# Patient Record
Sex: MALE | Race: WHITE | NOT HISPANIC OR LATINO | Employment: OTHER | URBAN - METROPOLITAN AREA
[De-identification: names, ages, dates, MRNs, and addresses within clinical notes are randomized per-mention and may not be internally consistent; named-entity substitution may affect disease eponyms.]

---

## 2019-02-01 ENCOUNTER — TELEPHONE (OUTPATIENT)
Dept: SCHEDULING | Facility: IMAGING CENTER | Age: 29
End: 2019-02-01

## 2019-03-11 ENCOUNTER — OFFICE VISIT (OUTPATIENT)
Dept: MEDICAL GROUP | Facility: MEDICAL CENTER | Age: 29
End: 2019-03-11
Attending: FAMILY MEDICINE
Payer: MEDICAID

## 2019-03-11 VITALS
WEIGHT: 300 LBS | HEART RATE: 76 BPM | TEMPERATURE: 98.2 F | SYSTOLIC BLOOD PRESSURE: 102 MMHG | OXYGEN SATURATION: 93 % | RESPIRATION RATE: 16 BRPM | DIASTOLIC BLOOD PRESSURE: 70 MMHG | HEIGHT: 71 IN | BODY MASS INDEX: 42 KG/M2

## 2019-03-11 DIAGNOSIS — R41.89 COGNITIVE DEFICITS: ICD-10-CM

## 2019-03-11 DIAGNOSIS — L73.2 LEFT AXILLARY HIDRADENITIS: ICD-10-CM

## 2019-03-11 PROCEDURE — 99203 OFFICE O/P NEW LOW 30 MIN: CPT | Performed by: FAMILY MEDICINE

## 2019-03-11 RX ORDER — ONDANSETRON 4 MG/1
4 TABLET, ORALLY DISINTEGRATING ORAL EVERY 6 HOURS PRN
COMMUNITY
End: 2022-04-11

## 2019-03-11 RX ORDER — LOPERAMIDE HYDROCHLORIDE 2 MG/1
2 CAPSULE ORAL 4 TIMES DAILY PRN
COMMUNITY
End: 2022-04-11

## 2019-03-11 ASSESSMENT — ENCOUNTER SYMPTOMS
FEVER: 0
SPUTUM PRODUCTION: 0
ABDOMINAL PAIN: 0
CHILLS: 0
SHORTNESS OF BREATH: 0
NAUSEA: 0
NEUROLOGICAL NEGATIVE: 1
VOMITING: 0
MUSCULOSKELETAL NEGATIVE: 1
PALPITATIONS: 0
EYES NEGATIVE: 1
PSYCHIATRIC NEGATIVE: 1
COUGH: 0

## 2019-03-11 ASSESSMENT — PAIN SCALES - GENERAL: PAINLEVEL: NO PAIN

## 2019-03-11 NOTE — PROGRESS NOTES
Subjective:      Rufus Bianchi is a 28 y.o. male who presents with New Patient and Cyst (underm, left )            Patient 28-year-old male here to establish with the clinic today.    He is currently under the care of case [facility].  He is here to establish for general care.  But has been having a recurrent boil under his left arm.  The boil is currently not present, but patient states that it does pop up randomly.  He is not having any issues with it at this time.  But he has been advised if he has any issues with the boil to return to clinic for further evaluation.  Will give patient information on hidradenitis suprativa.  And will continue to follow.    Discussed diet and exercising to help lower his BMI.  Stressed decreasing his fatty and fried food intake as well as increasing his fiber intake.  Also discussed exercising 4-5 times weekly for 20-30 minutes at a time.  We will continue to follow.    He has no other past medical history.    He also has no surgical history.    He is unaware of any significant family medical history.    He does have difficulty managing his daily activities due to cognitive deficiencies.  He is under the care of case services [facility].    He denies smoking tobacco, denies drinking alcohol and denies other substance use.        Review of Systems   Constitutional: Negative for chills and fever.   HENT: Negative for hearing loss and tinnitus.    Eyes: Negative.    Respiratory: Negative for cough, sputum production and shortness of breath.    Cardiovascular: Negative for chest pain and palpitations.   Gastrointestinal: Negative for abdominal pain, nausea and vomiting.   Genitourinary: Negative.    Musculoskeletal: Negative.    Skin: Negative for rash.   Neurological: Negative.    Endo/Heme/Allergies: Negative.    Psychiatric/Behavioral: Negative.           Objective:     /70 (BP Location: Right arm, Patient Position: Sitting, BP Cuff Size: Large adult)   Pulse 76   Temp  "36.8 °C (98.2 °F) (Temporal)   Resp 16   Ht 1.803 m (5' 11\")   Wt (!) 136.1 kg (300 lb)   SpO2 93%   BMI 41.84 kg/m²      Physical Exam   Constitutional: He is oriented to person, place, and time.   BMI 41.84   HENT:   Head: Normocephalic and atraumatic.   Nose: Nose normal.   Mouth/Throat: Oropharynx is clear and moist.   Eyes: Pupils are equal, round, and reactive to light. Conjunctivae and EOM are normal.   Neck: Normal range of motion. Neck supple. No thyromegaly present.   Cardiovascular: Normal rate, regular rhythm and normal heart sounds.  Exam reveals no friction rub.    No murmur heard.  Pulmonary/Chest: Effort normal and breath sounds normal. No respiratory distress. He has no wheezes. He has no rales.   Abdominal: Soft. Bowel sounds are normal. He exhibits no distension. There is no tenderness.   Musculoskeletal: Normal range of motion.   Lymphadenopathy:     He has no cervical adenopathy.   Neurological: He is alert and oriented to person, place, and time.   Skin: Skin is warm and dry.   Psychiatric: He has a normal mood and affect. His behavior is normal.   Nursing note and vitals reviewed.              Assessment/Plan:     1. BMI 40.0-44.9, adult (HCC)  Patient is currently in the process of trying to watch his diet as well as exercising as tolerated to decrease his BMI.    2. Left axillary hidradenitis  Information on hidradenitis has been given to patient.  We will continue to follow.    3. Cognitive deficits  He is under the care of case management service.  We will continue to follow.        "

## 2019-04-18 ENCOUNTER — OFFICE VISIT (OUTPATIENT)
Dept: MEDICAL GROUP | Facility: MEDICAL CENTER | Age: 29
End: 2019-04-18
Attending: FAMILY MEDICINE
Payer: MEDICAID

## 2019-04-18 VITALS
RESPIRATION RATE: 16 BRPM | OXYGEN SATURATION: 95 % | DIASTOLIC BLOOD PRESSURE: 80 MMHG | HEIGHT: 71 IN | TEMPERATURE: 97.5 F | SYSTOLIC BLOOD PRESSURE: 120 MMHG | WEIGHT: 297 LBS | HEART RATE: 74 BPM | BODY MASS INDEX: 41.58 KG/M2

## 2019-04-18 DIAGNOSIS — J01.00 ACUTE MAXILLARY SINUSITIS, RECURRENCE NOT SPECIFIED: ICD-10-CM

## 2019-04-18 PROCEDURE — 99214 OFFICE O/P EST MOD 30 MIN: CPT | Performed by: FAMILY MEDICINE

## 2019-04-18 PROCEDURE — 99212 OFFICE O/P EST SF 10 MIN: CPT | Performed by: FAMILY MEDICINE

## 2019-04-18 RX ORDER — AMOXICILLIN 500 MG/1
500 CAPSULE ORAL 3 TIMES DAILY
Qty: 30 CAP | Refills: 0 | Status: SHIPPED | OUTPATIENT
Start: 2019-04-18 | End: 2022-04-11

## 2019-04-18 ASSESSMENT — ENCOUNTER SYMPTOMS
SWOLLEN GLANDS: 1
CHILLS: 0
SORE THROAT: 1
HEADACHES: 0
WHEEZING: 0
SHORTNESS OF BREATH: 0
ABDOMINAL PAIN: 0
VOMITING: 0
DIARRHEA: 1
RHINORRHEA: 1
NECK PAIN: 0
SPUTUM PRODUCTION: 1
COUGH: 1
PALPITATIONS: 0
SINUS PAIN: 0
FEVER: 1
NAUSEA: 0

## 2019-04-18 NOTE — PROGRESS NOTES
"Subjective:      Rufus Bianchi is a 29 y.o. male who presents with Cough            URI    This is a new problem. The current episode started in the past 7 days. Maximum temperature: feels feverish. Associated symptoms include congestion, coughing, diarrhea, rhinorrhea, a sore throat and swollen glands. Pertinent negatives include no abdominal pain, chest pain, dysuria, ear pain, headaches, joint pain, joint swelling, nausea, neck pain, plugged ear sensation, rash, sinus pain, sneezing, vomiting or wheezing. Treatments tried: We will have him continue to use his over-the-counter medications as directed.  If still sick in 7 to 10 days we will start amoxicillin.       Review of Systems   Constitutional: Positive for fever. Negative for chills.   HENT: Positive for congestion, rhinorrhea and sore throat. Negative for ear pain, hearing loss, sinus pain, sneezing and tinnitus.    Respiratory: Positive for cough and sputum production. Negative for shortness of breath and wheezing.    Cardiovascular: Negative for chest pain and palpitations.   Gastrointestinal: Positive for diarrhea. Negative for abdominal pain, nausea and vomiting.   Genitourinary: Negative for dysuria.   Musculoskeletal: Negative for joint pain and neck pain.   Skin: Negative for rash.   Neurological: Negative for headaches.          Objective:     /80 (BP Location: Left arm, Patient Position: Sitting)   Pulse 74   Temp 36.4 °C (97.5 °F)   Resp 16   Ht 1.803 m (5' 11\")   Wt (!) 134.7 kg (297 lb)   SpO2 95%   BMI 41.42 kg/m²      Physical Exam   Constitutional: He is oriented to person, place, and time.   BMI 41.42   HENT:   Head: Normocephalic and atraumatic.   Congestion, TM dull bilaterally, enlarged tonsils, slight maxillary sinus tenderness   Neck: Normal range of motion. Neck supple. No thyromegaly present.   Cardiovascular: Normal rate, regular rhythm and normal heart sounds.  Exam reveals no friction rub.    No murmur " heard.  Pulmonary/Chest: Effort normal and breath sounds normal. No respiratory distress. He has no wheezes. He has no rales.   Abdominal: Soft. Bowel sounds are normal. He exhibits no distension. There is no tenderness.   Lymphadenopathy:     He has cervical adenopathy.   Neurological: He is alert and oriented to person, place, and time.   Skin: Skin is warm and dry.   Psychiatric: He has a normal mood and affect. His behavior is normal.   Nursing note and vitals reviewed.              Assessment/Plan:     1. Acute maxillary sinusitis, recurrence not specified  We will have patient continue using his over-the-counter medication as directed, if still having symptoms in 7 to 10 days we will start amoxicillin 500 mg 3 times daily.  We will continue to follow.  - amoxicillin (AMOXIL) 500 MG Cap; Take 1 Cap by mouth 3 times a day.  Dispense: 30 Cap; Refill: 0

## 2022-04-11 ENCOUNTER — OFFICE VISIT (OUTPATIENT)
Dept: URGENT CARE | Facility: CLINIC | Age: 32
End: 2022-04-11
Payer: MEDICAID

## 2022-04-11 VITALS
SYSTOLIC BLOOD PRESSURE: 120 MMHG | HEART RATE: 81 BPM | DIASTOLIC BLOOD PRESSURE: 66 MMHG | OXYGEN SATURATION: 97 % | HEIGHT: 73 IN | BODY MASS INDEX: 28.76 KG/M2 | TEMPERATURE: 97.2 F | RESPIRATION RATE: 16 BRPM | WEIGHT: 217 LBS

## 2022-04-11 DIAGNOSIS — J06.9 VIRAL URI: ICD-10-CM

## 2022-04-11 DIAGNOSIS — R05.9 COUGH: ICD-10-CM

## 2022-04-11 PROCEDURE — 99203 OFFICE O/P NEW LOW 30 MIN: CPT | Performed by: NURSE PRACTITIONER

## 2022-04-11 ASSESSMENT — ENCOUNTER SYMPTOMS
SORE THROAT: 1
FEVER: 0
DIARRHEA: 0
SHORTNESS OF BREATH: 0
CHILLS: 0
RHINORRHEA: 1
MYALGIAS: 0
EYE PAIN: 0
VOMITING: 0
COUGH: 1
NAUSEA: 0
DIZZINESS: 0
HEADACHES: 0

## 2022-04-11 NOTE — PROGRESS NOTES
"Subjective:   Rufus Bianchi is a 31 y.o. male who presents for Cough (Stuffy nose, dry throat, congestion x 3 days)      URI   This is a new problem. Episode onset: 3 days; denies sick contacts covid testing negative  The problem has been unchanged. There has been no fever. Associated symptoms include congestion, coughing, rhinorrhea and a sore throat. Pertinent negatives include no chest pain, diarrhea, ear pain, headaches, nausea, rash or vomiting. He has tried sleep for the symptoms. The treatment provided no relief.       Review of Systems   Constitutional: Positive for malaise/fatigue. Negative for chills and fever.   HENT: Positive for congestion, rhinorrhea and sore throat. Negative for ear pain.    Eyes: Negative for pain.   Respiratory: Positive for cough. Negative for shortness of breath.    Cardiovascular: Negative for chest pain.   Gastrointestinal: Negative for diarrhea, nausea and vomiting.   Genitourinary: Negative for hematuria.   Musculoskeletal: Negative for myalgias.   Skin: Negative for rash.   Neurological: Negative for dizziness and headaches.       Medications:    • MUCINEX ALLERGY PO    Allergies: Patient has no known allergies.    Problem List: Rufus Bianchi does not have any pertinent problems on file.    Surgical History:  No past surgical history on file.    Past Social Hx: Rufus Bianchi  reports that he has never smoked. He has never used smokeless tobacco. He reports that he does not drink alcohol and does not use drugs.     Past Family Hx:  Rufus Bianchi Family history is unknown by patient.     Problem list, medications, and allergies reviewed by myself today in Epic.     Objective:     /66 (BP Location: Left arm, Patient Position: Sitting, BP Cuff Size: Adult)   Pulse 81   Temp 36.2 °C (97.2 °F) (Temporal)   Resp 16   Ht 1.854 m (6' 1\")   Wt 98.4 kg (217 lb)   SpO2 97%   BMI 28.63 kg/m²     Physical Exam  Vitals and nursing note reviewed.   Constitutional:       General: He " is not in acute distress.     Appearance: He is well-developed.   HENT:      Head: Normocephalic and atraumatic.      Right Ear: Tympanic membrane and external ear normal.      Left Ear: Tympanic membrane and external ear normal.      Nose: Nose normal.      Right Sinus: No maxillary sinus tenderness or frontal sinus tenderness.      Left Sinus: No maxillary sinus tenderness or frontal sinus tenderness.      Mouth/Throat:      Mouth: Mucous membranes are moist.      Pharynx: Uvula midline. No posterior oropharyngeal erythema.      Tonsils: No tonsillar exudate or tonsillar abscesses.   Eyes:      General:         Right eye: No discharge.         Left eye: No discharge.      Conjunctiva/sclera: Conjunctivae normal.   Cardiovascular:      Rate and Rhythm: Normal rate.   Pulmonary:      Effort: Pulmonary effort is normal. No respiratory distress.      Breath sounds: Normal breath sounds.   Abdominal:      General: There is no distension.   Musculoskeletal:         General: Normal range of motion.   Skin:     General: Skin is warm and dry.   Neurological:      General: No focal deficit present.      Mental Status: He is alert and oriented to person, place, and time. Mental status is at baseline.      Gait: Gait (gait at baseline) normal.   Psychiatric:         Judgment: Judgment normal.         Assessment/Plan:     Diagnosis and associated orders:     1. Viral URI     2. Cough        Comments/MDM:     It was explained today that due to the viral nature of the pt's illness, we will treat symptomatically today.   Encouraged OTC supportive meds PRN. Humidification, increase fluids, avoid night time dairy.   Discussed side effects of OTC meds and any prescribed.  Given precautionary s/sx that mandate immediate follow up and evaluation in the ED. Advised of risks of not doing so.    DDX, Supportive care, and indications for immediate follow-up discussed with patient.    Instructed to return to clinic or nearest emergency  department if we are not available for any change in condition, further concerns, or worsening of symptoms.    The patient  and/or guardian demonstrated a good understanding and agreed with the treatment plan.    Please note that this dictation was created using voice recognition software. I have made every reasonable attempt to correct obvious errors, but I expect that there are errors of grammar and possibly content that I did not discover before finalizing the note.  •               Please note that this dictation was created using voice recognition software. I have made a reasonable attempt to correct obvious errors, but I expect that there are errors of grammar and possibly content that I did not discover before finalizing the note.    This note was electronically signed by Simone HARRIS.

## 2022-06-01 ENCOUNTER — APPOINTMENT (OUTPATIENT)
Dept: INTERNAL MEDICINE | Facility: OTHER | Age: 32
End: 2022-06-01
Payer: MEDICAID

## 2022-07-01 ENCOUNTER — OFFICE VISIT (OUTPATIENT)
Dept: INTERNAL MEDICINE | Facility: OTHER | Age: 32
End: 2022-07-01
Payer: MEDICAID

## 2022-07-01 VITALS
OXYGEN SATURATION: 93 % | SYSTOLIC BLOOD PRESSURE: 111 MMHG | BODY MASS INDEX: 29.72 KG/M2 | DIASTOLIC BLOOD PRESSURE: 76 MMHG | TEMPERATURE: 98.7 F | WEIGHT: 219.4 LBS | HEIGHT: 72 IN | HEART RATE: 72 BPM

## 2022-07-01 DIAGNOSIS — Z13.228 SCREENING FOR METABOLIC DISORDER: ICD-10-CM

## 2022-07-01 DIAGNOSIS — F84.0 AUTISM SPECTRUM DISORDER: ICD-10-CM

## 2022-07-01 DIAGNOSIS — E66.9 OBESITY (BMI 30-39.9): ICD-10-CM

## 2022-07-01 PROBLEM — L73.2 LEFT AXILLARY HIDRADENITIS: Status: RESOLVED | Noted: 2019-03-11 | Resolved: 2022-07-01

## 2022-07-01 PROCEDURE — 99204 OFFICE O/P NEW MOD 45 MIN: CPT | Mod: GC

## 2022-07-01 ASSESSMENT — ENCOUNTER SYMPTOMS
BRUISES/BLEEDS EASILY: 0
NAUSEA: 0
BLOOD IN STOOL: 0
PALPITATIONS: 0
BLURRED VISION: 0
DOUBLE VISION: 0
VOMITING: 0
WHEEZING: 0
SPUTUM PRODUCTION: 0
ORTHOPNEA: 0
CLAUDICATION: 0
ABDOMINAL PAIN: 0
CONSTIPATION: 0
INSOMNIA: 0
DIARRHEA: 0
NECK PAIN: 0
FEVER: 0
WEIGHT LOSS: 0
BACK PAIN: 0
SENSORY CHANGE: 0
NERVOUS/ANXIOUS: 0
WEAKNESS: 0
DIZZINESS: 0
HEARTBURN: 0
COUGH: 0
DEPRESSION: 0
SHORTNESS OF BREATH: 0
SORE THROAT: 0
EYE DISCHARGE: 0
HEADACHES: 0
CHILLS: 0

## 2022-07-01 ASSESSMENT — PATIENT HEALTH QUESTIONNAIRE - PHQ9
CLINICAL INTERPRETATION OF PHQ2 SCORE: 1
SUM OF ALL RESPONSES TO PHQ QUESTIONS 1-9: 7
5. POOR APPETITE OR OVEREATING: 0 - NOT AT ALL

## 2022-07-01 NOTE — ASSESSMENT & PLAN NOTE
Patient is on the autism spectrum disorder, very mild cognitive dysfunction, high functioning.  -Continue to monitor  -Continue to work with Rhett

## 2022-07-01 NOTE — PROGRESS NOTES
Subjective:     CC:  Diagnoses of Autism spectrum disorder, Obesity (BMI 30-39.9), and Screening for metabolic disorder were pertinent to this visit.    HISTORY OF THE PRESENT ILLNESS: Patient is a 32 y.o. male. This pleasant patient is here today to establish care.  His past medical history is significant for autism spectrum disorder, patient is high functioning.  He is very healthy.  Denies any complaints this morning.  He has very mild cognitive deficit.  Previously patient had a diagnosis of morbid obesity with a BMI of over 40, patient lost over 100 pounds with exercise and diet and is currently at a BMI of 30.1.  Patient presents with his caretaker who is a , Rhett.     Patient lives at home with his mother and stepfather.  Is a very healthy and stable home environment.  Patient does not work.  Social history is not significant for any tobacco, or drug use.  Patient very occasionally drinks wine and ice restaurant with his family.  No other stressors in life.    Patient continues to have a healthy diet.  He also continues to exercise multiple times a week: Kickboxing 3 times a week, aquatic aerobics twice a week.  He also is on a weight watchers program.  Patient takes multivitamins but no prescription medication.  Has seasonal allergies but he does not take any medications for that.    Overall, patient is very healthy, and denies any acute or chronic complaints.    Problem   Autism Spectrum Disorder   Obesity (Bmi 30-39.9)   Screening for Metabolic Disorder   Autistic Disorder   Bmi 40.0-44.9, Adult (Hcc) (Resolved)   Left Axillary Hidradenitis (Resolved)       Health Maintenance: Completed    ROS:   Review of Systems   Constitutional: Negative for chills, fever and weight loss.   HENT: Negative for congestion, hearing loss and sore throat.    Eyes: Negative for blurred vision, double vision and discharge.   Respiratory: Negative for cough, sputum production, shortness of breath and wheezing.   "  Cardiovascular: Negative for chest pain, palpitations, orthopnea, claudication and leg swelling.   Gastrointestinal: Negative for abdominal pain, blood in stool, constipation, diarrhea, heartburn, melena, nausea and vomiting.   Genitourinary: Negative for dysuria, frequency, hematuria and urgency.   Musculoskeletal: Negative for back pain, joint pain and neck pain.   Skin: Negative for rash.   Neurological: Negative for dizziness, sensory change, weakness and headaches.   Endo/Heme/Allergies: Negative for environmental allergies. Does not bruise/bleed easily.   Psychiatric/Behavioral: Negative for depression. The patient is not nervous/anxious and does not have insomnia.          Objective:     Exam: /76 (BP Location: Left arm, Patient Position: Sitting, BP Cuff Size: Adult)   Pulse 72   Temp 37.1 °C (98.7 °F) (Temporal)   Ht 1.816 m (5' 11.5\")   Wt 99.5 kg (219 lb 6.4 oz)   SpO2 93%  Body mass index is 30.17 kg/m².    Physical Exam  Constitutional:       General: He is not in acute distress.     Appearance: Normal appearance. He is normal weight. He is not ill-appearing.   HENT:      Head: Normocephalic and atraumatic.      Right Ear: External ear normal.      Left Ear: External ear normal.      Nose: Nose normal. No rhinorrhea.      Mouth/Throat:      Mouth: Mucous membranes are moist.      Pharynx: Oropharynx is clear.   Eyes:      General:         Right eye: No discharge.         Left eye: No discharge.      Extraocular Movements: Extraocular movements intact.      Pupils: Pupils are equal, round, and reactive to light.   Neck:      Vascular: No carotid bruit.   Cardiovascular:      Rate and Rhythm: Normal rate and regular rhythm.      Pulses: Normal pulses.      Heart sounds: Normal heart sounds. No murmur heard.    No friction rub.   Pulmonary:      Effort: Pulmonary effort is normal. No respiratory distress.      Breath sounds: Normal breath sounds. No wheezing or rhonchi.   Abdominal:      " General: Abdomen is flat. Bowel sounds are normal.      Palpations: Abdomen is soft.      Tenderness: There is no abdominal tenderness. There is no right CVA tenderness or left CVA tenderness.      Hernia: No hernia is present.   Musculoskeletal:         General: No tenderness. Normal range of motion.      Cervical back: Normal range of motion. No rigidity.      Right lower leg: No edema.      Left lower leg: No edema.   Skin:     General: Skin is warm and dry.      Capillary Refill: Capillary refill takes less than 2 seconds.      Findings: No lesion or rash.   Neurological:      General: No focal deficit present.      Mental Status: He is alert and oriented to person, place, and time. Mental status is at baseline.   Psychiatric:         Mood and Affect: Mood normal.         Behavior: Behavior normal.         Thought Content: Thought content normal.         Judgment: Judgment normal.       A chaperone was offered to the patient during today's exam. Chaperone name: Rhett () was present.    Labs:   No new labs to be reviewed at this time.    Assessment & Plan:   32 y.o. male with the following -    Problem List Items Addressed This Visit     Autism spectrum disorder     Patient is on the autism spectrum disorder, very mild cognitive dysfunction, high functioning.  -Continue to monitor  -Continue to work with Rhett            Obesity (BMI 30-39.9)     Patient lost over 100 pounds with diet and exercise.  Current BMI is 30.1, down from over 40.  -Continue to engage in healthy diet and exercise habits.           Relevant Orders    Patient identified as having weight management issue.  Appropriate orders and counseling given.    Screening for metabolic disorder     Since patient is establishing, order lab work to establish baseline.  -Patient has no acute complaints. Very pleasant and healthy individual.           Relevant Orders    CBC WITH DIFFERENTIAL    Comp Metabolic Panel    TSH WITH REFLEX TO  FT4    Lipid Profile    HEMOGLOBIN A1C          I spent a total of 55 minutes with record review, exam, communication with the patient, communication with other providers, and documentation of this encounter.    Return in about 1 year (around 7/1/2023).    Please note that this dictation was created using voice recognition software. I have made every reasonable attempt to correct obvious errors, but I expect that there are errors of grammar and possibly content that I did not discover before finalizing the note.

## 2022-07-01 NOTE — ASSESSMENT & PLAN NOTE
Patient lost over 100 pounds with diet and exercise.  Current BMI is 30.1, down from over 40.  -Continue to engage in healthy diet and exercise habits.

## 2022-07-01 NOTE — ASSESSMENT & PLAN NOTE
Since patient is establishing, order lab work to establish baseline.  -Patient has no acute complaints. Very pleasant and healthy individual.

## 2022-07-01 NOTE — PATIENT INSTRUCTIONS
-You came to the clinic today to establish care with me.  -You are very healthy, we discussed your exercise and eating habits.  Please continue to engage in exercise multiple times a week.  Congratulations on losing your weight, lets continue to keep that weight off with diet, exercise, and healthy lifestyle.  -I ordered some lab work to establish a baseline: CBC, CMP, thyroid, lipid panel, and hemoglobin A1c.  -Return to clinic in 1 year

## 2022-07-11 ENCOUNTER — OFFICE VISIT (OUTPATIENT)
Dept: INTERNAL MEDICINE | Facility: OTHER | Age: 32
End: 2022-07-11
Payer: MEDICAID

## 2022-07-11 VITALS
HEIGHT: 72 IN | WEIGHT: 220.4 LBS | BODY MASS INDEX: 29.85 KG/M2 | SYSTOLIC BLOOD PRESSURE: 105 MMHG | DIASTOLIC BLOOD PRESSURE: 69 MMHG | TEMPERATURE: 97.8 F | OXYGEN SATURATION: 97 % | HEART RATE: 67 BPM

## 2022-07-11 DIAGNOSIS — Z00.00 PREVENTATIVE HEALTH CARE: ICD-10-CM

## 2022-07-11 PROBLEM — Z13.228 SCREENING FOR METABOLIC DISORDER: Status: RESOLVED | Noted: 2022-07-01 | Resolved: 2022-07-11

## 2022-07-11 PROCEDURE — 99395 PREV VISIT EST AGE 18-39: CPT | Mod: EP,GC | Performed by: STUDENT IN AN ORGANIZED HEALTH CARE EDUCATION/TRAINING PROGRAM

## 2022-07-12 ASSESSMENT — ENCOUNTER SYMPTOMS
VOMITING: 0
DEPRESSION: 0
CHILLS: 0
NAUSEA: 0
DIARRHEA: 0
SHORTNESS OF BREATH: 0
PALPITATIONS: 0
BLURRED VISION: 0
HEMOPTYSIS: 0
CONSTIPATION: 0
DOUBLE VISION: 0
BLOOD IN STOOL: 0
COUGH: 0
FEVER: 0
SPUTUM PRODUCTION: 0

## 2022-07-12 NOTE — PROGRESS NOTES
Subjective:     CC: Preventative visit.    HPI:   Patient is a 32-year-old male past medical history of cognitive deficits, autism spectrum disorder, and obesity who presented to clinic today for a preventative exam with his caretaker Yehuda.    Patient denies any acute complaints at this time.  Patient's caretaker, Yehuda, also denies any acute concerns for patient since previous visit last week.    Patient is physically active and exercises regularly.  He is also working towards improving his diet and eating healthier.    Patient is up-to-date with vaccinations at this time.    No other concerns at this time    Problem   Screening for Metabolic Disorder (Resolved)       Health Maintenance: Completed    ROS:  Review of Systems   Constitutional: Negative for chills and fever.   HENT: Negative for ear pain, hearing loss and tinnitus.    Eyes: Negative for blurred vision and double vision.   Respiratory: Negative for cough, hemoptysis, sputum production and shortness of breath.    Cardiovascular: Negative for chest pain, palpitations and leg swelling.   Gastrointestinal: Negative for blood in stool, constipation, diarrhea, melena, nausea and vomiting.   Genitourinary: Negative for dysuria and urgency.   Skin: Negative for itching and rash.   Psychiatric/Behavioral: Negative for depression and suicidal ideas.       Objective:     Exam:  /69 (BP Location: Left arm, Patient Position: Sitting, BP Cuff Size: Adult)   Pulse 67   Temp 36.6 °C (97.8 °F) (Temporal)   Ht 1.829 m (6')   Wt 100 kg (220 lb 6.4 oz)   SpO2 97%   BMI 29.89 kg/m²  Body mass index is 29.89 kg/m².    Physical Exam  Constitutional:       General: He is not in acute distress.     Appearance: Normal appearance. He is normal weight. He is not ill-appearing, toxic-appearing or diaphoretic.   HENT:      Head: Normocephalic and atraumatic.      Mouth/Throat:      Mouth: Mucous membranes are moist.      Pharynx: Oropharynx is clear. No oropharyngeal  exudate or posterior oropharyngeal erythema.   Eyes:      General: No scleral icterus.        Right eye: No discharge.         Left eye: No discharge.      Conjunctiva/sclera: Conjunctivae normal.   Cardiovascular:      Rate and Rhythm: Normal rate and regular rhythm.      Pulses: Normal pulses.      Heart sounds: Normal heart sounds. No murmur heard.    No friction rub. No gallop.   Pulmonary:      Effort: Pulmonary effort is normal. No respiratory distress.      Breath sounds: Normal breath sounds. No stridor. No wheezing or rhonchi.   Abdominal:      General: Abdomen is flat. Bowel sounds are normal. There is no distension.      Palpations: Abdomen is soft. There is no mass.      Tenderness: There is no abdominal tenderness.      Hernia: No hernia is present.   Musculoskeletal:         General: No swelling or tenderness.      Right lower leg: No edema.      Left lower leg: No edema.   Skin:     General: Skin is warm and dry.      Coloration: Skin is not pale.      Findings: No erythema or rash.   Neurological:      General: No focal deficit present.      Mental Status: He is alert and oriented to person, place, and time. Mental status is at baseline.         Labs: Please see results section for further information.    Assessment & Plan:     Patient is a 32-year-old male past medical history of cognitive deficits, autism spectrum disorder, and obesity who presented to clinic today for a preventative exam with his caretaker Yehuda.    1. Preventative health care  -Patient still has lab work pending.  Patient reports he will work on getting his lab work done at his earliest convenience.  -Patient is up-to-date with his vaccinations and adult health maintenance.  -Full physical exam done and form filled out for patient before discharge.  -Patient working on increasing physical activity and working on eating healthy at this time.  -Patient denies any other concerns at this time.      Problem List Items Addressed This  Visit    None         I spent a total of 45 minutes with record review, exam, communication with the patient, communication with other providers, and documentation of this encounter.      Return in about 1 year (around 7/11/2023).

## 2022-08-10 ENCOUNTER — HOSPITAL ENCOUNTER (OUTPATIENT)
Dept: LAB | Facility: MEDICAL CENTER | Age: 32
End: 2022-08-10
Payer: MEDICAID

## 2022-08-10 DIAGNOSIS — Z13.228 SCREENING FOR METABOLIC DISORDER: ICD-10-CM

## 2022-08-10 LAB
ALBUMIN SERPL BCP-MCNC: 4.8 G/DL (ref 3.2–4.9)
ALBUMIN/GLOB SERPL: 1.7 G/DL
ALP SERPL-CCNC: 50 U/L (ref 30–99)
ALT SERPL-CCNC: 14 U/L (ref 2–50)
ANION GAP SERPL CALC-SCNC: 9 MMOL/L (ref 7–16)
AST SERPL-CCNC: 15 U/L (ref 12–45)
BASOPHILS # BLD AUTO: 1.1 % (ref 0–1.8)
BASOPHILS # BLD: 0.05 K/UL (ref 0–0.12)
BILIRUB SERPL-MCNC: 0.5 MG/DL (ref 0.1–1.5)
BUN SERPL-MCNC: 19 MG/DL (ref 8–22)
CALCIUM SERPL-MCNC: 9.8 MG/DL (ref 8.5–10.5)
CHLORIDE SERPL-SCNC: 104 MMOL/L (ref 96–112)
CHOLEST SERPL-MCNC: 148 MG/DL (ref 100–199)
CO2 SERPL-SCNC: 25 MMOL/L (ref 20–33)
CREAT SERPL-MCNC: 1.19 MG/DL (ref 0.5–1.4)
EOSINOPHIL # BLD AUTO: 0.11 K/UL (ref 0–0.51)
EOSINOPHIL NFR BLD: 2.4 % (ref 0–6.9)
ERYTHROCYTE [DISTWIDTH] IN BLOOD BY AUTOMATED COUNT: 40.9 FL (ref 35.9–50)
EST. AVERAGE GLUCOSE BLD GHB EST-MCNC: 97 MG/DL
FASTING STATUS PATIENT QL REPORTED: NORMAL
GFR SERPLBLD CREATININE-BSD FMLA CKD-EPI: 84 ML/MIN/1.73 M 2
GLOBULIN SER CALC-MCNC: 2.9 G/DL (ref 1.9–3.5)
GLUCOSE SERPL-MCNC: 88 MG/DL (ref 65–99)
HBA1C MFR BLD: 5 % (ref 4–5.6)
HCT VFR BLD AUTO: 46.1 % (ref 42–52)
HDLC SERPL-MCNC: 41 MG/DL
HGB BLD-MCNC: 15.2 G/DL (ref 14–18)
IMM GRANULOCYTES # BLD AUTO: 0 K/UL (ref 0–0.11)
IMM GRANULOCYTES NFR BLD AUTO: 0 % (ref 0–0.9)
LDLC SERPL CALC-MCNC: 93 MG/DL
LYMPHOCYTES # BLD AUTO: 1.62 K/UL (ref 1–4.8)
LYMPHOCYTES NFR BLD: 35.8 % (ref 22–41)
MCH RBC QN AUTO: 29.4 PG (ref 27–33)
MCHC RBC AUTO-ENTMCNC: 33 G/DL (ref 33.7–35.3)
MCV RBC AUTO: 89.2 FL (ref 81.4–97.8)
MONOCYTES # BLD AUTO: 0.39 K/UL (ref 0–0.85)
MONOCYTES NFR BLD AUTO: 8.6 % (ref 0–13.4)
NEUTROPHILS # BLD AUTO: 2.36 K/UL (ref 1.82–7.42)
NEUTROPHILS NFR BLD: 52.1 % (ref 44–72)
NRBC # BLD AUTO: 0 K/UL
NRBC BLD-RTO: 0 /100 WBC
PLATELET # BLD AUTO: 197 K/UL (ref 164–446)
PMV BLD AUTO: 10 FL (ref 9–12.9)
POTASSIUM SERPL-SCNC: 4.5 MMOL/L (ref 3.6–5.5)
PROT SERPL-MCNC: 7.7 G/DL (ref 6–8.2)
RBC # BLD AUTO: 5.17 M/UL (ref 4.7–6.1)
SODIUM SERPL-SCNC: 138 MMOL/L (ref 135–145)
TRIGL SERPL-MCNC: 69 MG/DL (ref 0–149)
TSH SERPL DL<=0.005 MIU/L-ACNC: 0.95 UIU/ML (ref 0.38–5.33)
WBC # BLD AUTO: 4.5 K/UL (ref 4.8–10.8)

## 2022-08-10 PROCEDURE — 85025 COMPLETE CBC W/AUTO DIFF WBC: CPT

## 2022-08-10 PROCEDURE — 84443 ASSAY THYROID STIM HORMONE: CPT

## 2022-08-10 PROCEDURE — 83036 HEMOGLOBIN GLYCOSYLATED A1C: CPT

## 2022-08-10 PROCEDURE — 80061 LIPID PANEL: CPT

## 2022-08-10 PROCEDURE — 80053 COMPREHEN METABOLIC PANEL: CPT

## 2022-08-10 PROCEDURE — 36415 COLL VENOUS BLD VENIPUNCTURE: CPT

## 2022-08-15 ENCOUNTER — TELEPHONE (OUTPATIENT)
Dept: HOSPITALIST | Facility: MEDICAL CENTER | Age: 32
End: 2022-08-15
Payer: MEDICAID

## 2022-08-15 NOTE — TELEPHONE ENCOUNTER
Called patient at the number listed in the chart to update about results.  The results are within normal limits.  Unable to leave voicemail since voicemail is not set up.  We will try calling again.

## 2023-03-07 ENCOUNTER — OFFICE VISIT (OUTPATIENT)
Dept: URGENT CARE | Facility: CLINIC | Age: 33
End: 2023-03-07
Payer: MEDICAID

## 2023-03-07 VITALS
SYSTOLIC BLOOD PRESSURE: 100 MMHG | TEMPERATURE: 98.6 F | BODY MASS INDEX: 33.6 KG/M2 | WEIGHT: 240 LBS | HEART RATE: 101 BPM | HEIGHT: 71 IN | DIASTOLIC BLOOD PRESSURE: 70 MMHG | OXYGEN SATURATION: 94 % | RESPIRATION RATE: 12 BRPM

## 2023-03-07 DIAGNOSIS — R11.0 NAUSEA: ICD-10-CM

## 2023-03-07 DIAGNOSIS — K52.9 ACUTE GASTROENTERITIS: ICD-10-CM

## 2023-03-07 PROCEDURE — 99214 OFFICE O/P EST MOD 30 MIN: CPT | Performed by: NURSE PRACTITIONER

## 2023-03-07 RX ORDER — ONDANSETRON 4 MG/1
4 TABLET, ORALLY DISINTEGRATING ORAL EVERY 6 HOURS PRN
Qty: 10 TABLET | Refills: 0 | Status: SHIPPED | OUTPATIENT
Start: 2023-03-07 | End: 2023-07-19

## 2023-03-07 ASSESSMENT — FIBROSIS 4 INDEX: FIB4 SCORE: 0.63

## 2023-03-07 NOTE — PROGRESS NOTES
Chief Complaint   Patient presents with    Nausea     Nausea, diarrhea x 1 night       HISTORY OF PRESENT ILLNESS: Patient is a pleasant 32 y.o. male who presents to urgent care today with his caregiver, both provide the history.  Patient developed GI symptoms last night.  Since then he has had several episodes of diarrhea and vomiting.  Endorses associated bloating sensation.  Denies any fever, significant abdominal pain.  Notes that his mother was ill recently with similar symptoms.  He has not tried any medication for symptom relief.    Patient Active Problem List    Diagnosis Date Noted    Autism spectrum disorder 07/01/2022    Obesity (BMI 30-39.9) 07/01/2022    Cognitive deficits 03/11/2019    Autistic disorder 12/04/2014       Allergies:Patient has no known allergies.    Current Outpatient Medications Ordered in Epic   Medication Sig Dispense Refill    ondansetron (ZOFRAN ODT) 4 MG TABLET DISPERSIBLE Take 1 Tablet by mouth every 6 hours as needed for Nausea/Vomiting. 10 Tablet 0     No current Epic-ordered facility-administered medications on file.       History reviewed. No pertinent past medical history.    Social History     Tobacco Use    Smoking status: Never    Smokeless tobacco: Never   Vaping Use    Vaping Use: Never used   Substance Use Topics    Alcohol use: Never    Drug use: Never       No family status information on file.     Family History   Family history unknown: Yes       ROS:  Review of Systems   Constitutional: Negative for fever, chills, weight loss, malaise, and fatigue.   HENT: Negative for ear pain, nosebleeds, congestion, sore throat and neck pain.    Eyes: Negative for vision changes.   Neuro: Negative for headache, sensory changes, weakness, seizure, LOC.   Cardiovascular: Negative for chest pain, palpitations, orthopnea and leg swelling.   Respiratory: Negative for cough, sputum production, shortness of breath and wheezing.   Gastrointestinal: Positive for bloating, nausea,  "vomiting, diarrhea.  Negative for abdominal pain.  Genitourinary: Negative for dysuria, urgency and frequency.  Musculoskeletal: Negative for falls, neck pain, back pain, joint pain, myalgias.   Skin: Negative for rash, diaphoresis.     Exam:  /70   Pulse (!) 101   Temp 37 °C (98.6 °F) (Temporal)   Resp 12   Ht 1.803 m (5' 11\")   Wt 109 kg (240 lb)   SpO2 94%   General: well-nourished, well-developed male in NAD  Head: normocephalic, atraumatic  Eyes: PERRLA, no conjunctival injection, acuity grossly intact, lids normal.  Ears: normal shape and symmetry, no tenderness, no discharge. External canals are without any significant edema or erythema. Tympanic membranes are without any inflammation, no effusion. Gross auditory acuity is intact.  Nose: symmetrical without tenderness, no discharge.  Mouth/Throat: reasonable hygiene, no erythema, exudates or tonsillar enlargement.  Neck: no masses, range of motion within normal limits, no tracheal deviation. No obvious thyroid enlargement.   Lymph: no cervical adenopathy. No supraclavicular adenopathy.   Neuro: alert and oriented. Cranial nerves 1-12 grossly intact. No sensory deficit.   Cardiovascular: regular rate auscultated 90, regular rhythm. No edema.  Pulmonary: no distress. Chest is symmetrical with respiration, no wheezes, crackles, or rhonchi.   Abdomen: soft, non-tender, no guarding, no hepatosplenomegaly.  Musculoskeletal: no clubbing, appropriate muscle tone, gait is stable.  Skin: warm, dry, intact, no clubbing, no cyanosis, no rashes.   Psych: appropriate mood, affect, judgement.         Assessment/Plan:  1. Acute gastroenteritis        2. Nausea  ondansetron (ZOFRAN ODT) 4 MG TABLET DISPERSIBLE          Discussed symptoms most likely viral, self limiting illness. Did not see any evidence of a bacterial process. Discussed natural progression and sx care.  Increase fluid intake, Zofran as needed.  Advance diet as tolerated.  Supportive care, " differential diagnoses, and indications for immediate follow-up discussed with patient.   Pathogenesis of diagnosis discussed including typical length and natural progression.   Instructed to return to clinic or nearest emergency department for any change in condition, further concerns, or worsening of symptoms.  Patient states understanding of the plan of care and discharge instructions.  Instructed to make an appointment, for follow up, with his primary care provider.        Please note that this dictation was created using voice recognition software. I have made every reasonable attempt to correct obvious errors, but I expect that there are errors of grammar and possibly content that I did not discover before finalizing the note.      FELICIANO Horton.

## 2023-04-30 DIAGNOSIS — F22 PARANOIA (HCC): ICD-10-CM

## 2023-04-30 DIAGNOSIS — F41.9 ANXIETY: ICD-10-CM

## 2023-06-14 ENCOUNTER — OFFICE VISIT (OUTPATIENT)
Dept: INTERNAL MEDICINE | Facility: OTHER | Age: 33
End: 2023-06-14
Payer: MEDICAID

## 2023-06-14 VITALS
HEIGHT: 72 IN | TEMPERATURE: 98.2 F | DIASTOLIC BLOOD PRESSURE: 76 MMHG | BODY MASS INDEX: 33.05 KG/M2 | HEART RATE: 75 BPM | WEIGHT: 244 LBS | OXYGEN SATURATION: 97 % | SYSTOLIC BLOOD PRESSURE: 117 MMHG

## 2023-06-14 DIAGNOSIS — Z13.228 SCREENING FOR METABOLIC DISORDER: ICD-10-CM

## 2023-06-14 PROCEDURE — 3078F DIAST BP <80 MM HG: CPT

## 2023-06-14 PROCEDURE — 99213 OFFICE O/P EST LOW 20 MIN: CPT | Mod: GC

## 2023-06-14 PROCEDURE — 3074F SYST BP LT 130 MM HG: CPT

## 2023-06-14 ASSESSMENT — ENCOUNTER SYMPTOMS
DOUBLE VISION: 0
DIARRHEA: 0
DEPRESSION: 0
WEAKNESS: 0
ABDOMINAL PAIN: 0
SENSORY CHANGE: 0
BRUISES/BLEEDS EASILY: 0
SORE THROAT: 0
SPUTUM PRODUCTION: 0
CLAUDICATION: 0
INSOMNIA: 0
CONSTIPATION: 0
BACK PAIN: 0
HEADACHES: 0
SHORTNESS OF BREATH: 0
FEVER: 0
BLOOD IN STOOL: 0
NECK PAIN: 0
WHEEZING: 0
EYE DISCHARGE: 0
PALPITATIONS: 0
NERVOUS/ANXIOUS: 0
BLURRED VISION: 0
VOMITING: 0
DIZZINESS: 0
ORTHOPNEA: 0
NAUSEA: 0
HEARTBURN: 0
WEIGHT LOSS: 0
COUGH: 0
CHILLS: 0

## 2023-06-14 ASSESSMENT — PATIENT HEALTH QUESTIONNAIRE - PHQ9: CLINICAL INTERPRETATION OF PHQ2 SCORE: 0

## 2023-06-14 ASSESSMENT — FIBROSIS 4 INDEX: FIB4 SCORE: 0.65

## 2023-06-14 NOTE — PROGRESS NOTES
"Subjective:     CC: follow up on obesity and lab work.    HPI:   Rufus presents today with his caretaker for follow up on his previous visit.  His past medical history is significant for autism spectrum disorder, patient is high functioning.  He is very healthy.  Denies any complaints this morning.    Patient is keeping himself busy. He takes [employer] classes. Also helps his parents with home chores. Patient says that he is recently getting into photography and doing really well with that. Patient tracks his calories and activities with apps like weight watchers. No acute concerns today.       ROS:  Review of Systems   Constitutional:  Negative for chills, fever and weight loss.   HENT:  Negative for congestion, hearing loss and sore throat.    Eyes:  Negative for blurred vision, double vision and discharge.   Respiratory:  Negative for cough, sputum production, shortness of breath and wheezing.    Cardiovascular:  Negative for chest pain, palpitations, orthopnea, claudication and leg swelling.   Gastrointestinal:  Negative for abdominal pain, blood in stool, constipation, diarrhea, heartburn, melena, nausea and vomiting.   Genitourinary:  Negative for dysuria, frequency, hematuria and urgency.   Musculoskeletal:  Negative for back pain, joint pain and neck pain.   Skin:  Negative for rash.   Neurological:  Negative for dizziness, sensory change, weakness and headaches.   Endo/Heme/Allergies:  Negative for environmental allergies. Does not bruise/bleed easily.   Psychiatric/Behavioral:  Negative for depression. The patient is not nervous/anxious and does not have insomnia.        Objective:     Exam:  /76 (BP Location: Left arm, Patient Position: Sitting, BP Cuff Size: Adult long)   Pulse 75   Temp 36.8 °C (98.2 °F) (Temporal)   Ht 1.816 m (5' 11.5\")   Wt 111 kg (244 lb)   SpO2 97%   BMI 33.56 kg/m²  Body mass index is 33.56 kg/m².    Physical Exam  Constitutional:       General: He is not in acute " distress.     Appearance: Normal appearance. He is obese. He is not ill-appearing.   HENT:      Head: Normocephalic and atraumatic.      Right Ear: External ear normal.      Left Ear: External ear normal.      Nose: Nose normal. No rhinorrhea.      Mouth/Throat:      Mouth: Mucous membranes are moist.      Pharynx: Oropharynx is clear.   Eyes:      General:         Right eye: No discharge.         Left eye: No discharge.      Extraocular Movements: Extraocular movements intact.      Pupils: Pupils are equal, round, and reactive to light.   Neck:      Vascular: No carotid bruit.   Cardiovascular:      Rate and Rhythm: Normal rate and regular rhythm.      Pulses: Normal pulses.      Heart sounds: Normal heart sounds. No murmur heard.     No friction rub.   Pulmonary:      Effort: Pulmonary effort is normal. No respiratory distress.      Breath sounds: Normal breath sounds. No wheezing or rhonchi.   Abdominal:      General: Abdomen is flat. Bowel sounds are normal.      Palpations: Abdomen is soft.      Tenderness: There is no abdominal tenderness. There is no right CVA tenderness or left CVA tenderness.      Hernia: No hernia is present.   Musculoskeletal:         General: No tenderness. Normal range of motion.      Cervical back: Normal range of motion. No rigidity.      Right lower leg: No edema.      Left lower leg: No edema.   Skin:     General: Skin is warm and dry.      Capillary Refill: Capillary refill takes less than 2 seconds.      Findings: No lesion or rash.   Neurological:      General: No focal deficit present.      Mental Status: He is alert and oriented to person, place, and time. Mental status is at baseline.   Psychiatric:         Mood and Affect: Mood normal.         Behavior: Behavior normal.         Thought Content: Thought content normal.         Judgment: Judgment normal.         A chaperone was offered to the patient during today's exam.: Chaperone  was present.    Labs:   Previous  labs were discussed with the patient, no follow-up questions.  New lab work ordered for this visit.    Assessment & Plan:     33 y.o. male with the following -       1. Screening for metabolic disorder  2. Obesity  Patient is keeping himself busy. He takes [employer] classes. Also helps his parents with home chores. Patient says that he is recently getting into photography and doing really well with that. Patient tracks his calories and activities with apps like weight watchers. No acute concerns today.   - TSH WITH REFLEX TO FT4; Future  - HEMOGLOBIN A1C; Future    3. Autism Spectrum Disorder  Rufus presents today with his caretaker for follow up on his previous visit.  His past medical history is significant for autism spectrum disorder, patient is high functioning.  He is very healthy.  Denies any complaints this morning.      I spent a total of 30 minutes with record review, exam, communication with the patient, communication with other providers, and documentation of this encounter.    Return in about 5 weeks (around 7/19/2023).    Please note that this dictation was created using voice recognition software. I have made every reasonable attempt to correct obvious errors, but I expect that there are errors of grammar and possibly content that I did not discover before finalizing the note.

## 2023-07-11 ENCOUNTER — HOSPITAL ENCOUNTER (OUTPATIENT)
Dept: LAB | Facility: MEDICAL CENTER | Age: 33
End: 2023-07-11
Payer: MEDICAID

## 2023-07-11 DIAGNOSIS — Z13.228 SCREENING FOR METABOLIC DISORDER: ICD-10-CM

## 2023-07-11 LAB
EST. AVERAGE GLUCOSE BLD GHB EST-MCNC: 105 MG/DL
HBA1C MFR BLD: 5.3 % (ref 4–5.6)
TSH SERPL DL<=0.005 MIU/L-ACNC: 1.05 UIU/ML (ref 0.38–5.33)

## 2023-07-11 PROCEDURE — 36415 COLL VENOUS BLD VENIPUNCTURE: CPT

## 2023-07-11 PROCEDURE — 83036 HEMOGLOBIN GLYCOSYLATED A1C: CPT

## 2023-07-11 PROCEDURE — 84443 ASSAY THYROID STIM HORMONE: CPT

## 2023-07-17 SDOH — HEALTH STABILITY: PHYSICAL HEALTH: ON AVERAGE, HOW MANY MINUTES DO YOU ENGAGE IN EXERCISE AT THIS LEVEL?: 60 MIN

## 2023-07-17 SDOH — HEALTH STABILITY: PHYSICAL HEALTH: ON AVERAGE, HOW MANY DAYS PER WEEK DO YOU ENGAGE IN MODERATE TO STRENUOUS EXERCISE (LIKE A BRISK WALK)?: 4 DAYS

## 2023-07-17 SDOH — ECONOMIC STABILITY: HOUSING INSECURITY
IN THE LAST 12 MONTHS, WAS THERE A TIME WHEN YOU DID NOT HAVE A STEADY PLACE TO SLEEP OR SLEPT IN A SHELTER (INCLUDING NOW)?: NO

## 2023-07-17 SDOH — ECONOMIC STABILITY: TRANSPORTATION INSECURITY
IN THE PAST 12 MONTHS, HAS LACK OF TRANSPORTATION KEPT YOU FROM MEETINGS, WORK, OR FROM GETTING THINGS NEEDED FOR DAILY LIVING?: NO

## 2023-07-17 SDOH — ECONOMIC STABILITY: INCOME INSECURITY: IN THE LAST 12 MONTHS, WAS THERE A TIME WHEN YOU WERE NOT ABLE TO PAY THE MORTGAGE OR RENT ON TIME?: NO

## 2023-07-17 SDOH — ECONOMIC STABILITY: FOOD INSECURITY: WITHIN THE PAST 12 MONTHS, THE FOOD YOU BOUGHT JUST DIDN'T LAST AND YOU DIDN'T HAVE MONEY TO GET MORE.: NEVER TRUE

## 2023-07-17 SDOH — ECONOMIC STABILITY: FOOD INSECURITY: WITHIN THE PAST 12 MONTHS, YOU WORRIED THAT YOUR FOOD WOULD RUN OUT BEFORE YOU GOT MONEY TO BUY MORE.: NEVER TRUE

## 2023-07-17 SDOH — HEALTH STABILITY: MENTAL HEALTH

## 2023-07-17 SDOH — ECONOMIC STABILITY: INCOME INSECURITY: HOW HARD IS IT FOR YOU TO PAY FOR THE VERY BASICS LIKE FOOD, HOUSING, MEDICAL CARE, AND HEATING?: NOT HARD AT ALL

## 2023-07-17 SDOH — ECONOMIC STABILITY: HOUSING INSECURITY: IN THE LAST 12 MONTHS, HOW MANY PLACES HAVE YOU LIVED?: 1

## 2023-07-17 SDOH — ECONOMIC STABILITY: TRANSPORTATION INSECURITY
IN THE PAST 12 MONTHS, HAS LACK OF RELIABLE TRANSPORTATION KEPT YOU FROM MEDICAL APPOINTMENTS, MEETINGS, WORK OR FROM GETTING THINGS NEEDED FOR DAILY LIVING?: NO

## 2023-07-17 SDOH — ECONOMIC STABILITY: TRANSPORTATION INSECURITY
IN THE PAST 12 MONTHS, HAS THE LACK OF TRANSPORTATION KEPT YOU FROM MEDICAL APPOINTMENTS OR FROM GETTING MEDICATIONS?: NO

## 2023-07-17 ASSESSMENT — SOCIAL DETERMINANTS OF HEALTH (SDOH)
HOW OFTEN DO YOU GET TOGETHER WITH FRIENDS OR RELATIVES?: MORE THAN THREE TIMES A WEEK
ARE YOU MARRIED, WIDOWED, DIVORCED, SEPARATED, NEVER MARRIED, OR LIVING WITH A PARTNER?: NEVER MARRIED
IN A TYPICAL WEEK, HOW MANY TIMES DO YOU TALK ON THE PHONE WITH FAMILY, FRIENDS, OR NEIGHBORS?: MORE THAN THREE TIMES A WEEK
DO YOU BELONG TO ANY CLUBS OR ORGANIZATIONS SUCH AS CHURCH GROUPS UNIONS, FRATERNAL OR ATHLETIC GROUPS, OR SCHOOL GROUPS?: YES
DO YOU BELONG TO ANY CLUBS OR ORGANIZATIONS SUCH AS CHURCH GROUPS UNIONS, FRATERNAL OR ATHLETIC GROUPS, OR SCHOOL GROUPS?: YES
IN A TYPICAL WEEK, HOW MANY TIMES DO YOU TALK ON THE PHONE WITH FAMILY, FRIENDS, OR NEIGHBORS?: MORE THAN THREE TIMES A WEEK
HOW HARD IS IT FOR YOU TO PAY FOR THE VERY BASICS LIKE FOOD, HOUSING, MEDICAL CARE, AND HEATING?: NOT HARD AT ALL
HOW OFTEN DO YOU HAVE A DRINK CONTAINING ALCOHOL: NEVER
HOW OFTEN DO YOU GET TOGETHER WITH FRIENDS OR RELATIVES?: MORE THAN THREE TIMES A WEEK
HOW OFTEN DO YOU HAVE SIX OR MORE DRINKS ON ONE OCCASION: NEVER
HOW OFTEN DO YOU ATTENT MEETINGS OF THE CLUB OR ORGANIZATION YOU BELONG TO?: MORE THAN 4 TIMES PER YEAR
HOW MANY DRINKS CONTAINING ALCOHOL DO YOU HAVE ON A TYPICAL DAY WHEN YOU ARE DRINKING: PATIENT DOES NOT DRINK
HOW OFTEN DO YOU ATTEND CHURCH OR RELIGIOUS SERVICES?: NEVER
HOW OFTEN DO YOU ATTEND CHURCH OR RELIGIOUS SERVICES?: NEVER
HOW OFTEN DO YOU ATTENT MEETINGS OF THE CLUB OR ORGANIZATION YOU BELONG TO?: MORE THAN 4 TIMES PER YEAR
ARE YOU MARRIED, WIDOWED, DIVORCED, SEPARATED, NEVER MARRIED, OR LIVING WITH A PARTNER?: NEVER MARRIED
WITHIN THE PAST 12 MONTHS, YOU WORRIED THAT YOUR FOOD WOULD RUN OUT BEFORE YOU GOT THE MONEY TO BUY MORE: NEVER TRUE

## 2023-07-17 ASSESSMENT — LIFESTYLE VARIABLES
HOW OFTEN DO YOU HAVE A DRINK CONTAINING ALCOHOL: NEVER
SKIP TO QUESTIONS 9-10: 1
HOW OFTEN DO YOU HAVE SIX OR MORE DRINKS ON ONE OCCASION: NEVER
HOW MANY STANDARD DRINKS CONTAINING ALCOHOL DO YOU HAVE ON A TYPICAL DAY: PATIENT DOES NOT DRINK
AUDIT-C TOTAL SCORE: 0

## 2023-07-19 ENCOUNTER — OFFICE VISIT (OUTPATIENT)
Dept: INTERNAL MEDICINE | Facility: OTHER | Age: 33
End: 2023-07-19
Payer: MEDICAID

## 2023-07-19 VITALS
BODY MASS INDEX: 33.72 KG/M2 | OXYGEN SATURATION: 95 % | HEIGHT: 72 IN | DIASTOLIC BLOOD PRESSURE: 70 MMHG | HEART RATE: 76 BPM | SYSTOLIC BLOOD PRESSURE: 110 MMHG | WEIGHT: 249 LBS | TEMPERATURE: 98.3 F

## 2023-07-19 DIAGNOSIS — Z00.00 WELLNESS EXAMINATION: ICD-10-CM

## 2023-07-19 PROCEDURE — G0439 PPPS, SUBSEQ VISIT: HCPCS | Mod: GE

## 2023-07-19 PROCEDURE — 3078F DIAST BP <80 MM HG: CPT

## 2023-07-19 PROCEDURE — 3074F SYST BP LT 130 MM HG: CPT

## 2023-07-19 ASSESSMENT — FIBROSIS 4 INDEX: FIB4 SCORE: 0.65

## 2023-07-19 NOTE — PROGRESS NOTES
Subjective:     CC:   Chief Complaint   Patient presents with    Annual Exam       HPI:   Rufus Bianchi is a 33 y.o. male who presents for annual exam    Last Colorectal Cancer Screening: N/A  Last Tdap: Unknown   Received HPV series: Patient does not recall (not sexually active)  Hx STDs: No    Exercise: moderate regular exercise, aerobic < 3 days a week  Diet: mixed fruits and vegetables      He  has no past medical history on file.  He  has no past surgical history on file.    Family History   Family history unknown: Yes     Social History     Tobacco Use    Smoking status: Never    Smokeless tobacco: Never   Vaping Use    Vaping Use: Never used   Substance Use Topics    Alcohol use: Yes     Comment: occ    Drug use: Never     He  reports never being sexually active.    Patient Active Problem List    Diagnosis Date Noted    Autism spectrum disorder 07/01/2022    Obesity (BMI 30-39.9) 07/01/2022    Cognitive deficits 03/11/2019    Autistic disorder 12/04/2014     Current Outpatient Medications   Medication Sig Dispense Refill    ondansetron (ZOFRAN ODT) 4 MG TABLET DISPERSIBLE Take 1 Tablet by mouth every 6 hours as needed for Nausea/Vomiting. (Patient not taking: Reported on 6/14/2023) 10 Tablet 0     No current facility-administered medications for this visit.     No Known Allergies    Review of Systems   Constitutional: Negative for fever, chills and malaise/fatigue.   HENT: Negative for congestion.    Eyes: Negative for pain.   Respiratory: Negative for cough and shortness of breath.    Cardiovascular: Negative for chest pain and leg swelling.   Gastrointestinal: Negative for nausea, vomiting, abdominal pain and diarrhea.   Genitourinary: Negative for dysuria and hematuria.   Skin: Negative for rash.   Neurological: Negative for dizziness, focal weakness and headaches.   Endo/Heme/Allergies: Does not bruise/bleed easily.   Psychiatric/Behavioral: Negative for depression.  The patient is not nervous/anxious.  "     Objective:   /70 (BP Location: Right arm, Patient Position: Sitting, BP Cuff Size: Adult)   Pulse 76   Temp 36.8 °C (98.3 °F) (Temporal)   Ht 1.816 m (5' 11.5\")   Wt 113 kg (249 lb)   SpO2 95%   BMI 34.24 kg/m²      Wt Readings from Last 4 Encounters:   07/19/23 113 kg (249 lb)   06/14/23 111 kg (244 lb)   03/07/23 109 kg (240 lb)   07/11/22 100 kg (220 lb 6.4 oz)       A chaperone was offered to the patient during today's exam. Chaperone name: caretaker was present.    Physical Exam:  Constitutional: Well-developed and well-nourished. Not diaphoretic. No distress.   Skin: Skin is warm and dry. No rash noted.  Head: Atraumatic without lesions.  Eyes: Conjunctivae and extraocular motions are normal. Pupils are equal, round, and reactive to light. No scleral icterus.   Ears:  External ears unremarkable. Tympanic membranes clear and intact.  Nose: Nares patent. Septum midline. Turbinates without erythema nor edema. No discharge.   Mouth/Throat: Tongue normal. Oropharynx is clear and moist. Posterior pharynx without erythema or exudates.  Neck: Supple, trachea midline. Normal range of motion. No thyromegaly present. No lymphadenopathy--cervical or supraclavicular.  Cardiovascular: Regular rate and rhythm, S1 and S2 without murmur, rubs, or gallops.    Respiratory: Effort normal. Clear to auscultation throughout. No adventitious sounds.   Abdomen: Soft, non tender, and without distention. Active bowel sounds in all four quadrants. No rebound, guarding, masses or HSM.  Extremities: No cyanosis, clubbing, erythema, nor edema. Distal pulses intact and symmetric.   Musculoskeletal: All major joints AROM full in all directions without pain.  Neurological: Alert and oriented x 3. Grossly non-focal. Strength and sensation grossly intact. DTRs 2+/3 and symmetric.   Psychiatric:  Behavior, mood, and affect are appropriate.      Assessment and Plan:     There are no diagnoses linked to this encounter.    Health " maintenance: done   Labs per orders  Immunizations per orders  Patient counseled about skin care, diet, supplements, and exercise.  Discussed diet and exercise, colorectal cancer screening, STD prevention, HIV risk factors and prevention, family planning choices, adequate intake of calcium and vitamin D, and osteoporosis     Follow-up: No follow-ups on file.

## 2024-02-13 ENCOUNTER — OFFICE VISIT (OUTPATIENT)
Dept: INTERNAL MEDICINE | Facility: OTHER | Age: 34
End: 2024-02-13
Payer: MEDICAID

## 2024-02-13 VITALS
SYSTOLIC BLOOD PRESSURE: 113 MMHG | HEIGHT: 71 IN | TEMPERATURE: 97.6 F | HEART RATE: 76 BPM | OXYGEN SATURATION: 96 % | WEIGHT: 256 LBS | BODY MASS INDEX: 35.84 KG/M2 | DIASTOLIC BLOOD PRESSURE: 73 MMHG

## 2024-02-13 DIAGNOSIS — Z23 INFLUENZA VACCINE ADMINISTERED: ICD-10-CM

## 2024-02-13 DIAGNOSIS — Z13.228 SCREENING FOR METABOLIC DISORDER: ICD-10-CM

## 2024-02-13 DIAGNOSIS — E66.9 OBESITY (BMI 30-39.9): ICD-10-CM

## 2024-02-13 PROCEDURE — 90471 IMMUNIZATION ADMIN: CPT

## 2024-02-13 PROCEDURE — 90686 IIV4 VACC NO PRSV 0.5 ML IM: CPT

## 2024-02-13 PROCEDURE — 1126F AMNT PAIN NOTED NONE PRSNT: CPT

## 2024-02-13 PROCEDURE — 99213 OFFICE O/P EST LOW 20 MIN: CPT | Mod: 25,GE

## 2024-02-13 PROCEDURE — 3074F SYST BP LT 130 MM HG: CPT

## 2024-02-13 PROCEDURE — 3078F DIAST BP <80 MM HG: CPT

## 2024-02-13 RX ORDER — HYDROXYZINE HYDROCHLORIDE 10 MG/1
10 TABLET, FILM COATED ORAL 2 TIMES DAILY
COMMUNITY

## 2024-02-13 ASSESSMENT — ENCOUNTER SYMPTOMS
SENSORY CHANGE: 0
CONSTIPATION: 0
SPUTUM PRODUCTION: 0
DIZZINESS: 0
BLURRED VISION: 0
BLOOD IN STOOL: 0
DIARRHEA: 0
NERVOUS/ANXIOUS: 0
INSOMNIA: 0
FEVER: 0
WHEEZING: 0
PALPITATIONS: 0
SORE THROAT: 0
DOUBLE VISION: 0
WEAKNESS: 0
VOMITING: 0
BACK PAIN: 0
HEADACHES: 0
NECK PAIN: 0
NAUSEA: 0
DEPRESSION: 0
ORTHOPNEA: 0
BRUISES/BLEEDS EASILY: 0
EYE DISCHARGE: 0
CLAUDICATION: 0
WEIGHT LOSS: 0
COUGH: 0
ABDOMINAL PAIN: 0
SHORTNESS OF BREATH: 0
CHILLS: 0
HEARTBURN: 0

## 2024-02-13 ASSESSMENT — FIBROSIS 4 INDEX: FIB4 SCORE: 0.65

## 2024-02-13 ASSESSMENT — PAIN SCALES - GENERAL: PAINLEVEL: NO PAIN

## 2024-02-13 NOTE — PROGRESS NOTES
Subjective:     CC: Nutrition counseling referral    HPI:   Rufus presents today with his caretaker to request a nutrition consult referral.  Patient's past medical history significant for obesity with a BMI of 35.7, autism spectrum disorder, and cognitive deficits, for which he lives in a group home.    Patient previously was seen in clinic, and we discussed lifestyle modifications for weight loss.  Patient has been on weight watchers program for the past 4 years and he did have considerable success with the program.  However, over the last few months, he has had an increase in his weight.  Patient continues to exercise, is active in his lifestyle, and gets good sleep.  Previously, patient had hemoglobin A1c workup done, that showed nondiabetic range.  Patient will benefit from nutrition counseling and calorie counting.      ROS:  Review of Systems   Constitutional:  Negative for chills, fever and weight loss.   HENT:  Negative for congestion, hearing loss and sore throat.    Eyes:  Negative for blurred vision, double vision and discharge.   Respiratory:  Negative for cough, sputum production, shortness of breath and wheezing.    Cardiovascular:  Negative for chest pain, palpitations, orthopnea, claudication and leg swelling.   Gastrointestinal:  Negative for abdominal pain, blood in stool, constipation, diarrhea, heartburn, melena, nausea and vomiting.   Genitourinary:  Negative for dysuria, frequency, hematuria and urgency.   Musculoskeletal:  Negative for back pain, joint pain and neck pain.   Skin:  Negative for rash.   Neurological:  Negative for dizziness, sensory change, weakness and headaches.   Endo/Heme/Allergies:  Negative for environmental allergies. Does not bruise/bleed easily.   Psychiatric/Behavioral:  Negative for depression. The patient is not nervous/anxious and does not have insomnia.        Objective:     Exam:  /73 (BP Location: Left arm, Patient Position: Sitting, BP Cuff Size: Large  "adult long)   Pulse 76   Temp 36.4 °C (97.6 °F) (Temporal)   Ht 1.803 m (5' 11\")   Wt 116 kg (256 lb)   SpO2 96%   BMI 35.70 kg/m²  Body mass index is 35.7 kg/m².    Physical Exam  Constitutional:       General: He is not in acute distress.     Appearance: Normal appearance. He is normal weight. He is not ill-appearing.   HENT:      Head: Normocephalic and atraumatic.      Right Ear: External ear normal.      Left Ear: External ear normal.      Nose: Nose normal. No rhinorrhea.      Mouth/Throat:      Mouth: Mucous membranes are moist.      Pharynx: Oropharynx is clear.   Eyes:      General:         Right eye: No discharge.         Left eye: No discharge.      Extraocular Movements: Extraocular movements intact.      Pupils: Pupils are equal, round, and reactive to light.   Neck:      Vascular: No carotid bruit.   Cardiovascular:      Rate and Rhythm: Normal rate and regular rhythm.      Pulses: Normal pulses.      Heart sounds: Normal heart sounds. No murmur heard.     No friction rub.   Pulmonary:      Effort: Pulmonary effort is normal. No respiratory distress.      Breath sounds: Normal breath sounds. No wheezing or rhonchi.   Abdominal:      General: Abdomen is flat. Bowel sounds are normal.      Palpations: Abdomen is soft.      Tenderness: There is no abdominal tenderness. There is no right CVA tenderness or left CVA tenderness.      Hernia: No hernia is present.   Musculoskeletal:         General: No tenderness. Normal range of motion.      Cervical back: Normal range of motion. No rigidity.      Right lower leg: No edema.      Left lower leg: No edema.   Skin:     General: Skin is warm and dry.      Capillary Refill: Capillary refill takes less than 2 seconds.      Findings: No lesion or rash.   Neurological:      General: No focal deficit present.      Mental Status: He is alert and oriented to person, place, and time. Mental status is at baseline.   Psychiatric:         Mood and Affect: Mood normal. "         Behavior: Behavior normal.         Thought Content: Thought content normal.         Judgment: Judgment normal.       Labs:   Previous labs discussed with the patient.  No follow-up questions.      Assessment & Plan:     33 y.o. male with the following -    1. Screening for metabolic disorder  - HEMOGLOBIN A1C; Future  - Lipid Profile; Future    2. Obesity (BMI 30-39.9)  Patient previously was seen in clinic, and we discussed lifestyle modifications for weight loss.  Patient has been on weight watchers program for the past 4 years and he did have considerable success with the program.  However, over the last few months, he has had an increase in his weight.  Patient continues to exercise, is active in his lifestyle, and gets good sleep.  Previously, patient had hemoglobin A1c workup done, that showed nondiabetic range.  Patient will benefit from nutrition counseling and calorie counting.  - Referral to Nutrition Services  - HEMOGLOBIN A1C; Future  - Lipid Profile; Future    3. Influenza vaccine administered  - Influenza Vaccine Quad Injection (PF)      I spent a total of 20 minutes with record review, exam, communication with the patient, communication with other providers, and documentation of this encounter.    Return in about 3 months (around 5/13/2024).    Please note that this dictation was created using voice recognition software. I have made every reasonable attempt to correct obvious errors, but I expect that there are errors of grammar and possibly content that I did not discover before finalizing the note.    Isamar Foss M.D.  Internal Medicine Resident  [facility]

## 2024-05-07 ENCOUNTER — NON-PROVIDER VISIT (OUTPATIENT)
Dept: INTERNAL MEDICINE | Facility: OTHER | Age: 34
End: 2024-05-07
Payer: MEDICAID

## 2024-05-07 VITALS — BODY MASS INDEX: 35.84 KG/M2 | WEIGHT: 256 LBS | HEIGHT: 71 IN

## 2024-05-07 DIAGNOSIS — E66.9 OBESITY (BMI 30-39.9): ICD-10-CM

## 2024-05-07 PROCEDURE — 97802 MEDICAL NUTRITION INDIV IN: CPT | Performed by: DIETITIAN, REGISTERED

## 2024-05-07 ASSESSMENT — FIBROSIS 4 INDEX: FIB4 SCORE: 0.67

## 2024-05-07 NOTE — PROGRESS NOTES
"Rufus Bianchi is a 34 y.o. year old, male initial nutrition evaluation for weight loss management; has been on WW over one year and lost 103 lbs and has continued to go, weight has come back on by 50% with continued activity level    Wellness Vision:  I want to     My Health Profile:    PHYSICAL ASSESSMENT  Current Height:  71\"  Ht Readings from Last 1 Encounters:   02/13/24 1.803 m (5' 11\")     Current Weight:  256#  Wt Readings from Last 1 Encounters:   02/13/24 116 kg (256 lb)     BMI:  35    Goal Weight:  190-200#  Lowest weight on WW: 6/24/21- 202.8# lost 102.2#  Recent weight last Friday- 252.6# (lost 54#)    Total Body Water (lbs): 120#  Total Body Water (%): 46.8%  Visceral Fat: 17   (Range: Less than 13)  Total Body Fat: (lbs): 94.6#  % Body Fat: 36.9%   Muscle Mass (lbs): 153.6#  Muscle Mass (%): 60%  Fat-Free Mass: 161.6#  Resting Metabolic Rate: 2200 calories  Weight Loss Calories: 0711-3376 calories  Maintenance Calories: 2770 calories   Protein needs:  grams per day    NUTRITION PHYSICAL ASSESSMENT:  Waist cm: 49\"    FLUID INTAKE:  Water   Typical Beverages: 2-3 diet tea, soda on w/e, water  Servings Alcohol/D/WK/MO: none    ACTIVITY REVIEW: hiking, [place] twice a week, kickboxing 2/7 1-2 hours with [employer]on Do  Current Exercise: stationary bike 2/7  Hobbies: hike with family on w/e    PERTINENT LABS: wnl    Patient Behaviors (indicate frequency)  Meal/Snack Pattern:   Weight Watchers points given per day: 48    B: HBO cereal +almond milk-unsweetened + banana/apple  Work around house  L: salads, sometimes sandwiches, water or diet tea w/banana or apple, orange at lunch  S: 1-granola bar or fruit snack  D: dine out/take out or home meal  Home meals: Fish/chicken+vegetable+brown rice  HS: usually none unless he hasn't reached total points for the day, occ Fiber One ice cream bar    Dines away from Home: 2-3 x/week  Locations:  Fabien Leacho [place]  Who lives in home: mom, " aqu[place]  Additional Patient Behavior Information: goes to grocer store with support staff to buy supportive foods: quest protein bars, shakes, frozen entrees- [employer]    PES: Obesity I r/t successful weight loss through Weight Watchers, now with plateau and 50 lbs weight regain as evidenced by BMI 35    NUTRITION COMMENTS:    Rufus is 33 yo committed to once a week WW workshops and has managed his weight over one year; desires to know what he can do to shift into more weight loss. Currently feels plateau which leads to frustration.    Rufus is present with support staff, Sarah- works with Rufus 4 hours/day    Barriers: cravings after lunch- breaks out the gum to curtail    Reviewed body composition measurements and supporting his high activity level with proper energy and protein for fueling and replenishment.      RTC x next available      Time Spent: 60 minutes

## 2024-05-07 NOTE — PATIENT INSTRUCTIONS
"Change up my exercise routine slightly  - get on the treadmill with interval bursts  - be safe, have fun and know my limits    I will add more protein to my meal planning  - Grilled chicken items  - add protein to my breakfast  - keep within WW workshop goals     PHYSICAL ASSESSMENT  Current Height:  71\"  Ht Readings from Last 1 Encounters:   02/13/24 1.803 m (5' 11\")     Current Weight:  256#  Wt Readings from Last 1 Encounters:   02/13/24 116 kg (256 lb)     BMI:  35    Goal Weight:  190-200#  Lowest weight on WW: 6/24/21- 202.8# lost 102.2#  Recent weight last Friday- 252.6# (lost 54#)    Total Body Water (lbs): 120#  Total Body Water (%): 46.8%  Visceral Fat: 17   (Range: Less than 13)  Total Body Fat: (lbs): 94.6#  % Body Fat: 36.9%   Muscle Mass (lbs): 153.6#  Muscle Mass (%): 60%  Fat-Free Mass: 161.6#  Resting Metabolic Rate: 2200 calories  Weight Loss Calories: 3399-9292 calories  Maintenance Calories: 2770 calories   Protein needs:  grams per day    NUTRITION PHYSICAL ASSESSMENT:  Waist cm: 49\"  "

## 2024-05-14 ENCOUNTER — OFFICE VISIT (OUTPATIENT)
Dept: INTERNAL MEDICINE | Facility: OTHER | Age: 34
End: 2024-05-14
Payer: MEDICAID

## 2024-05-14 VITALS
HEIGHT: 71 IN | OXYGEN SATURATION: 96 % | HEART RATE: 61 BPM | SYSTOLIC BLOOD PRESSURE: 119 MMHG | DIASTOLIC BLOOD PRESSURE: 74 MMHG | WEIGHT: 260.8 LBS | TEMPERATURE: 97.7 F | BODY MASS INDEX: 36.51 KG/M2

## 2024-05-14 DIAGNOSIS — Z11.59 NEED FOR HEPATITIS C SCREENING TEST: ICD-10-CM

## 2024-05-14 DIAGNOSIS — Z11.4 SCREENING FOR HIV (HUMAN IMMUNODEFICIENCY VIRUS): ICD-10-CM

## 2024-05-14 DIAGNOSIS — Z13.228 SCREENING FOR METABOLIC DISORDER: ICD-10-CM

## 2024-05-14 PROBLEM — Z00.00 WELLNESS EXAMINATION: Status: RESOLVED | Noted: 2023-07-19 | Resolved: 2024-05-14

## 2024-05-14 PROCEDURE — 99213 OFFICE O/P EST LOW 20 MIN: CPT | Mod: GC

## 2024-05-14 PROCEDURE — 3074F SYST BP LT 130 MM HG: CPT | Mod: GC

## 2024-05-14 PROCEDURE — 3078F DIAST BP <80 MM HG: CPT | Mod: GC

## 2024-05-14 ASSESSMENT — ENCOUNTER SYMPTOMS
CONSTITUTIONAL NEGATIVE: 1
MUSCULOSKELETAL NEGATIVE: 1
PSYCHIATRIC NEGATIVE: 1
NEUROLOGICAL NEGATIVE: 1
GASTROINTESTINAL NEGATIVE: 1
CARDIOVASCULAR NEGATIVE: 1
RESPIRATORY NEGATIVE: 1
EYES NEGATIVE: 1

## 2024-05-14 ASSESSMENT — FIBROSIS 4 INDEX: FIB4 SCORE: 0.67

## 2024-05-14 NOTE — PROGRESS NOTES
Established Patient    Patient Care Team:  Isamar Foss M.D. as PCP - General (Internal Medicine)    Rufus Bianchi is a 34 y.o. male who presents today with the following Chief Complaint(s): Follow up for There were no encounter diagnoses.    HPI:  Rufus Bianchi is a 33 y/o male patient who presented today for follow up visit.  Patient has no new complaints/concerns.  Patient establishing care with Dr. Radha Das.  Patient presented to the visit with care , Sarah. Pt is seeing the nutritionist at [facility], and is targeting increasing dietary protein, physical activity for management of weight and overall health. 12-point ROS negative at today's visit. No other complaints/concerns today.    Wt 118 kg (260 lb 12.8 oz)   BMI 36.37 kg/m²   Review of Systems   Constitutional: Negative.    HENT: Negative.     Eyes: Negative.    Respiratory: Negative.     Cardiovascular: Negative.    Gastrointestinal: Negative.    Genitourinary: Negative.    Musculoskeletal: Negative.    Skin: Negative.    Neurological: Negative.    Endo/Heme/Allergies: Negative.    Psychiatric/Behavioral: Negative.         No past medical history on file.  Social History     Tobacco Use    Smoking status: Never    Smokeless tobacco: Never   Vaping Use    Vaping Use: Never used   Substance Use Topics    Alcohol use: Not Currently     Comment: occ    Drug use: Never     Current Outpatient Medications   Medication Sig Dispense Refill    Multiple Vitamins-Minerals (MULTIVITAMIN ADULTS PO)       hydrOXYzine HCl (ATARAX) 10 MG Tab Take 10 mg by mouth 2 times a day.       No current facility-administered medications for this visit.         Physical Exam  Constitutional:       Appearance: Normal appearance. He is obese.   HENT:      Right Ear: Tympanic membrane normal.      Left Ear: Tympanic membrane and ear canal normal.      Nose: Nose normal.   Cardiovascular:      Rate and Rhythm: Normal rate and regular rhythm.       Pulses: Normal pulses.      Heart sounds: Normal heart sounds.   Pulmonary:      Effort: Pulmonary effort is normal.      Breath sounds: Normal breath sounds.   Abdominal:      General: Bowel sounds are normal.      Palpations: Abdomen is soft.   Musculoskeletal:         General: Normal range of motion.      Cervical back: Normal range of motion and neck supple.   Skin:     General: Skin is warm.   Neurological:      General: No focal deficit present.      Mental Status: He is alert and oriented to person, place, and time. Mental status is at baseline.   Psychiatric:         Mood and Affect: Mood normal.         Thought Content: Thought content normal.         Judgment: Judgment normal.      Comments: Pt has cognitive defects, however, is alert and oriented x 4.         Assessment and Plan:   1. Screening for metabolic disorder  In setting of obesity, ordering repeat A1c + lipid panel to screen patient for metabolic disorder. Also discussed dietary modifications and non-meat sources of protein, including chick peas, tofu, lentils. Pt agreed to incorporate said proteins into his diet. Reports continuing WW, feels content with current progress. Pt will be establishing care w/ new PCP. Labs will be discussed once completed prior to new PCP f/u.  - HEMOGLOBIN A1C; Future  - Lipid Profile; Future    2. Screening for HIV (human immunodeficiency virus)  Pt elected to undergo HIV screening at this visit.  - HIV AG/AB COMBO ASSAY SCREENING; Future    3. Need for hepatitis C screening test  Pt elected to undergo HCV screening at this visit.  - HEP C VIRUS ANTIBODY; Future        No orders of the defined types were placed in this encounter.      No follow-ups on file.    Greg Gomez M.D. PGY I  Internal Medicine  [facility]

## 2024-06-07 ENCOUNTER — HOSPITAL ENCOUNTER (OUTPATIENT)
Dept: LAB | Facility: MEDICAL CENTER | Age: 34
End: 2024-06-07
Payer: MEDICAID

## 2024-06-07 DIAGNOSIS — Z11.59 NEED FOR HEPATITIS C SCREENING TEST: ICD-10-CM

## 2024-06-07 DIAGNOSIS — Z11.4 SCREENING FOR HIV (HUMAN IMMUNODEFICIENCY VIRUS): ICD-10-CM

## 2024-06-07 DIAGNOSIS — Z13.228 SCREENING FOR METABOLIC DISORDER: ICD-10-CM

## 2024-06-07 LAB
CHOLEST SERPL-MCNC: 177 MG/DL (ref 100–199)
EST. AVERAGE GLUCOSE BLD GHB EST-MCNC: 103 MG/DL
HBA1C MFR BLD: 5.2 % (ref 4–5.6)
HCV AB SER QL: NORMAL
HDLC SERPL-MCNC: 40 MG/DL
HIV 1+2 AB+HIV1 P24 AG SERPL QL IA: NORMAL
LDLC SERPL CALC-MCNC: 117 MG/DL
TRIGL SERPL-MCNC: 101 MG/DL (ref 0–149)

## 2024-06-07 PROCEDURE — 80061 LIPID PANEL: CPT

## 2024-06-07 PROCEDURE — 83036 HEMOGLOBIN GLYCOSYLATED A1C: CPT

## 2024-06-07 PROCEDURE — 87389 HIV-1 AG W/HIV-1&-2 AB AG IA: CPT

## 2024-06-07 PROCEDURE — 86803 HEPATITIS C AB TEST: CPT

## 2024-06-07 PROCEDURE — 36415 COLL VENOUS BLD VENIPUNCTURE: CPT

## 2024-06-11 ENCOUNTER — OFFICE VISIT (OUTPATIENT)
Dept: MEDICAL GROUP | Facility: MEDICAL CENTER | Age: 34
End: 2024-06-11
Attending: FAMILY MEDICINE
Payer: MEDICAID

## 2024-06-11 VITALS
WEIGHT: 253 LBS | RESPIRATION RATE: 14 BRPM | HEART RATE: 60 BPM | TEMPERATURE: 97.3 F | OXYGEN SATURATION: 97 % | BODY MASS INDEX: 35.42 KG/M2 | DIASTOLIC BLOOD PRESSURE: 82 MMHG | SYSTOLIC BLOOD PRESSURE: 116 MMHG | HEIGHT: 71 IN

## 2024-06-11 DIAGNOSIS — Z78.9 VARICELLA VACCINATION STATUS UNKNOWN: ICD-10-CM

## 2024-06-11 DIAGNOSIS — E66.09 CLASS 2 OBESITY DUE TO EXCESS CALORIES WITHOUT SERIOUS COMORBIDITY WITH BODY MASS INDEX (BMI) OF 35.0 TO 35.9 IN ADULT: ICD-10-CM

## 2024-06-11 DIAGNOSIS — F41.1 GAD (GENERALIZED ANXIETY DISORDER): ICD-10-CM

## 2024-06-11 DIAGNOSIS — F84.0 AUTISM SPECTRUM DISORDER: ICD-10-CM

## 2024-06-11 DIAGNOSIS — E78.00 PURE HYPERCHOLESTEROLEMIA: ICD-10-CM

## 2024-06-11 PROBLEM — E66.812 CLASS 2 OBESITY DUE TO EXCESS CALORIES WITHOUT SERIOUS COMORBIDITY WITH BODY MASS INDEX (BMI) OF 35.0 TO 35.9 IN ADULT: Status: ACTIVE | Noted: 2022-07-01

## 2024-06-11 PROCEDURE — 99214 OFFICE O/P EST MOD 30 MIN: CPT | Performed by: FAMILY MEDICINE

## 2024-06-11 PROCEDURE — 99213 OFFICE O/P EST LOW 20 MIN: CPT | Performed by: FAMILY MEDICINE

## 2024-06-11 PROCEDURE — 3074F SYST BP LT 130 MM HG: CPT | Performed by: FAMILY MEDICINE

## 2024-06-11 PROCEDURE — 3079F DIAST BP 80-89 MM HG: CPT | Performed by: FAMILY MEDICINE

## 2024-06-11 ASSESSMENT — FIBROSIS 4 INDEX: FIB4 SCORE: 0.67

## 2024-06-11 NOTE — ASSESSMENT & PLAN NOTE
Established with nutritionist  Has been with weight watchers, initially lost 100 lbs but now back about 50 lbs

## 2024-06-11 NOTE — ASSESSMENT & PLAN NOTE
Pt is able to perform activities of daily living, does some food prep but can eat toilet, transfer by himself   Hydroxyzine 10mg prn   Improved behavioral, sees psychiatry

## 2024-06-11 NOTE — PROGRESS NOTES
Subjective:     CC:    Chief Complaint   Patient presents with    Establish Care       HISTORY OF THE PRESENT ILLNESS: Patient is a 34 y.o. male. This pleasant patient is here today to establish care and discuss the following issues.   His/her prior PCP was Dr. Foss.    Specialists -   - nutritionist      Class 2 obesity due to excess calories without serious comorbidity with body mass index (BMI) of 35.0 to 35.9 in adult  Established with nutritionist  Has been with weight watchers, initially lost 100 lbs but now back about 50 lbs    Autism spectrum disorder  Pt is able to perform activities of daily living, does some food prep but can eat toilet, transfer by himself   Hydroxyzine 10mg prn   Improved behavioral, sees psychiatry     PAWAN (generalized anxiety disorder)  Sees psychiatry, on hydroxyzine 10mg prn  Sees therapy         Allergies: Patient has no known allergies.    Current Outpatient Medications Ordered in Epic   Medication Sig Dispense Refill    Multiple Vitamins-Minerals (MULTIVITAMIN ADULTS PO)       hydrOXYzine HCl (ATARAX) 10 MG Tab Take 10 mg by mouth 2 times a day.       No current [facility]-ordered facility-administered medications on file.       History reviewed. No pertinent past medical history.    History reviewed. No pertinent surgical history.    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    General: Normal appearing. No distress.  Head: normocephalic  Eyes:  Eyes conjunctiva clear lids without ptosis, pupils equal and reactive to light accommodation  ENT: Ears normal shape and contour, canals are clear bilaterally, tympanic membranes are benign, oropharynx is without erythema, edema or exudates.   Neck: Supple. Thyroid is not enlarged.  Pulmonary: Clear to ausculation.  Normal effort. No rales, ronchi, or wheezing.  Cardiovascular: Regular rate and rhythm without murmur.   Abdomen: Soft, nontender, nondistended. Normal bowel sounds.  Psych: Normal mood and affect.      Data reviewed:   Reviewed last labs - A1c 5.2% 6/7, lipids mildly elevated     Assessment & Plan:   34 y.o. male with the following -    1. Class 2 obesity due to excess calories without serious comorbidity with body mass index (BMI) of 35.0 to 35.9 in adult  Pt is seeing nutrition  Just completed A1c and lipids  Can recheck in 1 year  He has very strong support and people to encourage healthy eating and exercise.     2. Autism spectrum disorder  Is able to perform ADLs. Not many behavioral concerns    3. PAWAN (generalized anxiety disorder)  Sees counselor and psychiatrist. Well managed with Prn hydroxyzine. Continue hydroxyzine 10mg prn    4. Varicella vaccination status unknown  - VARICELLA ZOSTER IGG AB; Future    5. Pure hypercholesterolemia  Very mild elevation of LDL  Recheck in 1 year  Seeing nutritionist       Return in about 1 month (around 7/11/2024) for annual physical.    Please note that this dictation was created using voice recognition software. I have made every reasonable attempt to correct obvious errors, but I expect that there are errors of grammar and possibly content that I did not discover before finalizing the note.  "

## 2024-08-06 ENCOUNTER — NON-PROVIDER VISIT (OUTPATIENT)
Dept: INTERNAL MEDICINE | Facility: OTHER | Age: 34
End: 2024-08-06
Payer: MEDICAID

## 2024-08-06 DIAGNOSIS — Z71.3 DIETARY COUNSELING AND SURVEILLANCE: ICD-10-CM

## 2024-08-06 DIAGNOSIS — E66.9 OBESITY (BMI 30-39.9): ICD-10-CM

## 2024-08-06 PROCEDURE — 97803 MED NUTRITION INDIV SUBSEQ: CPT | Performed by: DIETITIAN, REGISTERED

## 2024-08-06 ASSESSMENT — FIBROSIS 4 INDEX: FIB4 SCORE: 0.67

## 2024-08-06 NOTE — PATIENT INSTRUCTIONS
"I will find my sardines, boneless, skinless in olive oil  - put on my sandwiches or salads  - 24 grams of healthy protein!    I will find new veggie patties  - lentil with less ingredients  - black bean burgers  - keep finding more healthy proteins and snacks!    I will keep moving!  - dancing  - treadmill  - elliptical  - every day I eat, I move!          PHYSICAL RE-ASSESSMENT  Current Height:  71\"      Ht Readings from Last 1 Encounters:   02/13/24 1.803 m (5' 11\")      Current Weight:  256#      Wt Readings from Last 1 Encounters:   02/13/24 116 kg (256 lb)      BMI:  35     Goal Weight:  190-200#  Lowest weight on WW: 6/24/21- 202.8# lost 102.2#  Recent weight last Friday- 252.6# (lost 54#)     Total Body Water (lbs): 124.8#  Total Body Water (%): 46.6%  Visceral Fat: 15   (Range: Less than 13)  Total Body Fat: (lbs): 82.2#  % Body Fat: 32%   Muscle Mass (lbs): 166#  Muscle Mass (%): 65%  Fat-Free Mass: 174.6#  Resting Metabolic Rate: 2200 calories  Weight Loss Calories: 2538-6007 calories  Maintenance Calories: 2770 calories   Protein needs:  grams per day     NUTRITION PHYSICAL ASSESSMENT:  Waist cm: 49.5\"  "

## 2024-08-06 NOTE — PROGRESS NOTES
"Rufus Bianchi is a 34 y.o. year old, male returns for weight management follow up.    Positive changes since last visit:    Sampled black bean burger when dining out    Eating bananas twice a day, plenty of vegetables- V8 juice, more salads.    WW workshops continue weekly- learn new ways of healthy and fitness, eating new foods and other proteins.    Works on under desk cycle machine after meals to curtail hunger    Visit [facility] and participate in dance classes 45 min.- WIN!    Drinking lots of water when working on, or when out on errands goes to [employer] for a water-WIN!    Brought his stuffed animal, Glimmer    Meal/Eating/Environment Pattern:    Challenges: family brings him PB Chocolate Love which does not support his weight management goals.    Other challenges: getting his protein and how to order when dining out or when skipping breakfast to have brunch. Balancing his points.    Dining out: 1-2 x/week with family    PHYSICAL RE-ASSESSMENT  Current Height:  71\"      Ht Readings from Last 1 Encounters:   02/13/24 1.803 m (5' 11\")      Current Weight:  256#      Wt Readings from Last 1 Encounters:   02/13/24 116 kg (256 lb)      BMI:  35     Goal Weight:  190-200#  Lowest weight on WW: 6/24/21- 202.8# lost 102.2#  Recent weight last Friday- 252.6# (lost 54#)     Total Body Water (lbs): 124.8#  Total Body Water (%): 46.6%  Visceral Fat: 15   (Range: Less than 13)  Total Body Fat: (lbs): 82.2#  % Body Fat: 32%   Muscle Mass (lbs): 166#  Muscle Mass (%): 65%  Fat-Free Mass: 174.6#  Resting Metabolic Rate: 2200 calories  Weight Loss Calories: 0821-9179 calories  Maintenance Calories: 2770 calories   Protein needs:  grams per day     NUTRITION PHYSICAL ASSESSMENT:  Waist cm: 49.5\"    Comments:    Rufus is happy to see his body composition is improving despite weight loss on scale not seen.    %Body fat has decreased from 36.9% to 32% and %Muscle mass has increased from 60% to 65% with " intentional movement in his day.    Maintain course and be confident in the choices he makes within his challenging environment. Great work reinforced.      RTC x next available    Time Spent:  60 minutes

## 2024-08-07 VITALS — BODY MASS INDEX: 35.7 KG/M2 | WEIGHT: 256 LBS

## 2024-08-13 ENCOUNTER — OFFICE VISIT (OUTPATIENT)
Dept: MEDICAL GROUP | Facility: MEDICAL CENTER | Age: 34
End: 2024-08-13
Attending: FAMILY MEDICINE
Payer: MEDICAID

## 2024-08-13 VITALS
RESPIRATION RATE: 16 BRPM | WEIGHT: 256 LBS | OXYGEN SATURATION: 96 % | TEMPERATURE: 96.9 F | HEART RATE: 60 BPM | DIASTOLIC BLOOD PRESSURE: 62 MMHG | SYSTOLIC BLOOD PRESSURE: 92 MMHG | BODY MASS INDEX: 35.84 KG/M2 | HEIGHT: 71 IN

## 2024-08-13 DIAGNOSIS — E78.00 PURE HYPERCHOLESTEROLEMIA: ICD-10-CM

## 2024-08-13 DIAGNOSIS — Z00.00 WELLNESS EXAMINATION: ICD-10-CM

## 2024-08-13 PROCEDURE — 3078F DIAST BP <80 MM HG: CPT | Performed by: FAMILY MEDICINE

## 2024-08-13 PROCEDURE — 3074F SYST BP LT 130 MM HG: CPT | Performed by: FAMILY MEDICINE

## 2024-08-13 PROCEDURE — 99395 PREV VISIT EST AGE 18-39: CPT | Mod: EP | Performed by: FAMILY MEDICINE

## 2024-08-13 PROCEDURE — 99212 OFFICE O/P EST SF 10 MIN: CPT | Performed by: FAMILY MEDICINE

## 2024-08-13 NOTE — PROGRESS NOTES
Subjective:     CC:   Chief Complaint   Patient presents with    Annual Exam       HPI:   Rufus Bianchi is a 34 y.o. male who presents for an annual exam. He is feeling well and has no complaints.    Specialists -   Psychiatry, therapy    Health Maintenance  Advanced directive: n/a  PT/vit D for falls prevention: n/a   Cholesterol Screenin/24   Diabetes Screenin/24  AAA Screening:  n/a  Aspirin Use:   n/a  Diet:  seeing nutritionist, seeing weight watchers  Exercise:  kickboxing, taekowndo, dance, treadmill, aquacise - some kind of physical activity most days of the week.   Substance Abuse:  none  Safe in relationship.   Seat belts, bike helmet, gun safety discussed.  Sun protection used.    Cancer screening  Colorectal Cancer Screening:   n/a  Lung Cancer Screening:   n/a  Prostate Cancer Screening/PSA:  n/a    Infectious disease screening/Immunizations  --STI Screening: non needed. Not sexually active   --HIV Screening: done  --Hepatitis C Screening: done  --Immunizations: up to date           He  has no past medical history on file.  He  has no past surgical history on file.  Family History   Family history unknown: Yes     Social History     Tobacco Use    Smoking status: Never    Smokeless tobacco: Never   Vaping Use    Vaping status: Never Used   Substance Use Topics    Alcohol use: Not Currently     Comment: occ    Drug use: Never       Patient Active Problem List    Diagnosis Date Noted    PAWAN (generalized anxiety disorder) 2024    Pure hypercholesterolemia 2024    Autism spectrum disorder 2022    Class 2 obesity due to excess calories without serious comorbidity with body mass index (BMI) of 35.0 to 35.9 in adult 2022    Cognitive deficits 2019       Current Outpatient Medications   Medication Sig Dispense Refill    Multiple Vitamins-Minerals (MULTIVITAMIN ADULTS PO)       hydrOXYzine HCl (ATARAX) 10 MG Tab Take 10 mg by mouth 2 times a day.       No current  "facility-administered medications for this visit.    (including changes today)  Allergies: Patient has no known allergies.    Review of Systems   Constitutional: Negative for fever, chills and malaise/fatigue. Unintentional weight loss/weight gain  HENT: Nose, throat, ears ok   Eyes: wears glasses  Respiratory: Negative for cough and shortness of breath.    Cardiovascular: Negative for leg swelling. Negative for chest pain  Gastrointestinal: Negative for nausea, vomiting, abdominal pain and diarrhea, constipation  Genitourinary: Negative for dysuria and hematuria.   Skin: Negative for rash.   Neurological: Negative for dizziness, focal weakness and headaches.   Psychiatric/Behavioral: (+) depression/anxiety - has PRN meds, has therapist and psychiatrist at [place]    Objective:     BP 92/62 (BP Location: Left arm, Patient Position: Sitting)   Pulse 60   Temp 36.1 °C (96.9 °F) (Temporal)   Resp 16   Ht 1.803 m (5' 11\")   Wt 116 kg (256 lb)   SpO2 96%   BMI 35.70 kg/m²   Body mass index is 35.7 kg/m².  Wt Readings from Last 4 Encounters:   08/13/24 116 kg (256 lb)   08/06/24 116 kg (256 lb)   06/11/24 115 kg (253 lb)   05/14/24 118 kg (260 lb 12.8 oz)       Physical Exam:  Constitutional: Well-developed and well-nourished. Not diaphoretic. No distress.   Skin: Skin is warm and dry. No rash noted.  Eyes: Conjunctivae and extraocular motions are normal. Pupils are equal, round, and reactive to light. No scleral icterus.   Ears:  External ears unremarkable. Tympanic membranes clear and intact.  Nose: Nares patent. Septum midline. Turbinates without erythema nor edema  Mouth/Throat: Dentition is normal. Tongue normal. Oropharynx is clear and moist. Posterior pharynx without erythema or exudates.  Neck: Supple, trachea midline. No thyromegaly present. No lymphadenopathy--cervical or supraclavicular.  Cardiovascular: Regular rate and rhythm, S1 and S2 without murmur, rubs, or gallops.  Lungs: Normal inspiratory " effort, CTA bilaterally, no wheezes/rhonchi/rales  Abdomen: Soft, non tender, and without distention. Normoactive bowel sounds  : Genitalia: normal uncircumcised penis, no urethral discharge, scrotal contents normal to inspection and palpation, normal testes palpated bilaterally, no varicocele present, no hernia detected  Rectal: deferred  Prostate: deferred  Extremities: No edema  Musculoskeletal: ambulates normally  Neurological:  Neuro: CN II-XII intact, strength 5/5 in all muscle groups, sensation intact bilaterally to light touch   Psychiatric:  Behavior, mood, and affect are appropriate.    A chaperone was offered to the patient during today's exam. Chaperone name: Cecilia was present.    Assessment and Plan:     1. Wellness examination    2. Pure hypercholesterolemia  - Lipid Profile; Future  Lipids were elevated 2 mos ago. He has had some lifestyle changes so we will recheck in about 6 mos    HCM: per orders.  Labs per orders.  Vaccinations per orders.  Counseling about diet, supplements, exercise, skin care and safe sex.    Follow-up: Return if symptoms worsen or fail to improve.    Please note that this dictation was created using voice recognition software. I have made every reasonable attempt to correct obvious errors, but I expect that there are errors of grammar and possibly content that I did not discover before finalizing the note.

## 2024-12-03 ENCOUNTER — NON-PROVIDER VISIT (OUTPATIENT)
Dept: INTERNAL MEDICINE | Facility: OTHER | Age: 34
End: 2024-12-03
Payer: MEDICAID

## 2024-12-03 VITALS — WEIGHT: 255 LBS | BODY MASS INDEX: 35.57 KG/M2

## 2024-12-03 DIAGNOSIS — Z71.3 DIETARY COUNSELING AND SURVEILLANCE: ICD-10-CM

## 2024-12-03 DIAGNOSIS — R41.89 COGNITIVE DEFICITS: ICD-10-CM

## 2024-12-03 DIAGNOSIS — E78.5 HYPERLIPIDEMIA, UNSPECIFIED HYPERLIPIDEMIA TYPE: ICD-10-CM

## 2024-12-03 DIAGNOSIS — E66.812 CLASS 2 OBESITY WITHOUT SERIOUS COMORBIDITY WITH BODY MASS INDEX (BMI) OF 35.0 TO 35.9 IN ADULT, UNSPECIFIED OBESITY TYPE: ICD-10-CM

## 2024-12-03 PROCEDURE — 97803 MED NUTRITION INDIV SUBSEQ: CPT | Performed by: DIETITIAN, REGISTERED

## 2024-12-03 NOTE — PROGRESS NOTES
Rufus Bianchi is a 34 y.o. year old, male returns for weight management follow up.    Positive changes since last visit:    Incorporated new foods into daily routine: diet Snapples, less cereal for breakfast, switched to protein waffles, Egg Bites, protein shakes.    Dancing at [email or web address] every week, 1-2 x/week, 60 minutes, intense aerobic with [employer]- sweats up!     Brought in sardines into his salads- likes and needs to buy more at [employer]!    Dining out- ordered veggies to keep WW points in check    Found a favorite veggie rodolfo made with carrots and spinach, also black bean rodolfo enjoys; finding other protein sources- cottage     Meal/Eating/Environment Pattern:    More activity- trampoline, finding more movement in different day-to-day living! Met a new friend for his bday party at [employer].    Challenges: getting ideas on what to eat    Rufus states he makes sure to manage his points, WW has been good    Mom is preparing for bariatric surgery for herself, main cook in house, but not cooking for herself.    Comments:    Maintain great work and progress, keep fortitude during holidays; maintain with no gain!    RTC x 3 months    Time Spent:  60 minutes

## 2024-12-19 ENCOUNTER — HOSPITAL ENCOUNTER (OUTPATIENT)
Dept: LAB | Facility: MEDICAL CENTER | Age: 34
End: 2024-12-19
Attending: FAMILY MEDICINE
Payer: MEDICAID

## 2024-12-19 DIAGNOSIS — E78.00 PURE HYPERCHOLESTEROLEMIA: ICD-10-CM

## 2024-12-19 LAB
CHOLEST SERPL-MCNC: 159 MG/DL (ref 100–199)
HDLC SERPL-MCNC: 40 MG/DL
LDLC SERPL CALC-MCNC: 96 MG/DL
TRIGL SERPL-MCNC: 116 MG/DL (ref 0–149)

## 2024-12-19 PROCEDURE — 36415 COLL VENOUS BLD VENIPUNCTURE: CPT

## 2024-12-19 PROCEDURE — 80061 LIPID PANEL: CPT

## 2025-03-04 ENCOUNTER — NON-PROVIDER VISIT (OUTPATIENT)
Dept: INTERNAL MEDICINE | Facility: OTHER | Age: 35
End: 2025-03-04
Payer: MEDICAID

## 2025-03-04 DIAGNOSIS — E66.812 CLASS 2 OBESITY WITHOUT SERIOUS COMORBIDITY WITH BODY MASS INDEX (BMI) OF 35.0 TO 35.9 IN ADULT, UNSPECIFIED OBESITY TYPE: ICD-10-CM

## 2025-03-04 DIAGNOSIS — Z71.3 DIETARY COUNSELING AND SURVEILLANCE: ICD-10-CM

## 2025-03-04 DIAGNOSIS — R41.89 COGNITIVE DEFICITS: ICD-10-CM

## 2025-03-04 PROCEDURE — 97803 MED NUTRITION INDIV SUBSEQ: CPT | Performed by: DIETITIAN, REGISTERED

## 2025-03-04 NOTE — PATIENT INSTRUCTIONS
I will check out some new foods to add in  - Healthy Choice Power Bowls  - more protein, more fiber than Smart Ones    Birthday Hike or activity with family    Check out the website: [employer] Workouts for new strength exercises     Keep consistency in WW program

## 2025-03-04 NOTE — PROGRESS NOTES
"Rufus Bianchi is a 34 y.o. year old, male returns for weight management follow up.    Positive changes since last visit:    Dancing continues Tue/Thurs  Kick Boxing Tues  Water Aerobics Mon/Wed  Computer [employer] daily  Hike on weekends with family  Friend and community involvement    WW continues- helps Rufus focus on things; Wed gets extra rec days awarded, friend Kevin has been positive friendship.    Mom had bariatric surgery- step dad is now stepping up to make meals; Rufus can make microwave meals.    Rufus is finding new protein foods especially with mom needing more protein post surgery. Found a protein shake at [employer] that he drinks on Friday to support his \"weigh in\".     Meal/Eating/Environment Pattern:       PHYSICAL RE-ASSESSMENT  Current Height:  71\"        Ht Readings from Last 1 Encounters:   02/13/24 1.803 m (5' 11\")      Current Weight:  252#        Wt Readings from Last 1 Encounters:   02/13/24 116 kg (256 lb)      BMI:  35     Goal Weight:  190-200#  Lowest weight on WW: 6/24/21- 202.8# lost 102.2#  Recent weight last Friday- 252.6# (lost 54#)     Total Body Water (lbs): 121.2#  Total Body Water (%): 48%  Visceral Fat: 15   (Range: Less than 13)  Total Body Fat: (lbs): 84.6#  % Body Fat: 33.5%   Muscle Mass (lbs): 159.6#  Muscle Mass (%): 63%  Fat-Free Mass: 167.8#  Resting Metabolic Rate: 2200 calories  Weight Loss Calories: 3091-0321 calories  Maintenance Calories: 2770 calories   Protein needs:  grams per day     NUTRITION PHYSICAL ASSESSMENT:  Waist cm: 50.5\"    Comments:    Slight change in body composition since Dec. Some shift in muscle mass.    Coached on higher protein focus with finding ways to incorporate strength training with his cardio.    RTC x next available    Time Spent:  60 minutes   "

## 2025-03-19 ENCOUNTER — OFFICE VISIT (OUTPATIENT)
Dept: MEDICAL GROUP | Facility: MEDICAL CENTER | Age: 35
End: 2025-03-19
Attending: FAMILY MEDICINE
Payer: MEDICAID

## 2025-03-19 VITALS
HEIGHT: 71 IN | HEART RATE: 70 BPM | BODY MASS INDEX: 35.7 KG/M2 | WEIGHT: 255 LBS | OXYGEN SATURATION: 94 % | SYSTOLIC BLOOD PRESSURE: 112 MMHG | RESPIRATION RATE: 16 BRPM | DIASTOLIC BLOOD PRESSURE: 80 MMHG | TEMPERATURE: 97.8 F

## 2025-03-19 DIAGNOSIS — Z23 FLU VACCINE NEED: ICD-10-CM

## 2025-03-19 DIAGNOSIS — F84.0 AUTISM SPECTRUM DISORDER: ICD-10-CM

## 2025-03-19 DIAGNOSIS — Z02.5 SPORTS PHYSICAL: ICD-10-CM

## 2025-03-19 PROCEDURE — 99213 OFFICE O/P EST LOW 20 MIN: CPT | Mod: 25 | Performed by: FAMILY MEDICINE

## 2025-03-19 PROCEDURE — 3074F SYST BP LT 130 MM HG: CPT | Performed by: FAMILY MEDICINE

## 2025-03-19 PROCEDURE — 3079F DIAST BP 80-89 MM HG: CPT | Performed by: FAMILY MEDICINE

## 2025-03-19 PROCEDURE — 99213 OFFICE O/P EST LOW 20 MIN: CPT | Performed by: FAMILY MEDICINE

## 2025-03-19 PROCEDURE — 90471 IMMUNIZATION ADMIN: CPT

## 2025-03-19 NOTE — PROGRESS NOTES
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   He has been working with a nutritionist for weight loss  He denies any chest pain or SOB with exercising. He denies cyanosis or swelling.   No history of joint problems, neck problems.       History reviewed. No pertinent past medical history.    Social History     Tobacco Use    Smoking status: Never    Smokeless tobacco: Never   Vaping Use    Vaping status: Never Used   Substance Use Topics    Alcohol use: Not Currently     Comment: occ    Drug use: Never       Current Outpatient Medications Ordered in Epic   Medication Sig Dispense Refill    Multiple Vitamins-Minerals (MULTIVITAMIN ADULTS PO)  (Patient not taking: Reported on 3/19/2025)      hydrOXYzine HCl (ATARAX) 10 MG Tab Take 10 mg by mouth 2 times a day. (Patient not taking: Reported on 3/19/2025)       No current [facility]-ordered facility-administered medications on file.       Allergies:  Patient has no known allergies.        Objective:       Exam:  /80 (BP Location: Left arm, Patient Position: Sitting, BP Cuff Size: Adult)   Pulse 70   Temp 36.6 °C (97.8 °F) (Temporal)   Resp 16   Ht 1.803 m (5' 11\")   Wt 116 kg (255 lb)   SpO2 94%   BMI 35.57 kg/m²  Body mass index is 35.57 kg/m².    General: Normal appearing. No distress.  Eyes:  Eyes conjunctiva clear lids without ptosis, pupils equal and reactive to light accommodation  ENT: Ears normal shape and contour, canals are clear bilaterally, tympanic membranes are benign, oropharynx is without erythema, edema or exudates.   Neck: Supple. Thyroid is not enlarged.  Pulmonary: Clear to ausculation.  Normal effort. No rales, ronchi, or wheezing.  Cardiovascular: Regular rate and rhythm without murmur.   Abdomen: Soft, nontender, nondistended. Normal bowel sounds.  Neurologic: no facial droop  Lymph: No cervical " or supraclavicular lymph nodes are palpable  Skin: Warm and dry.  No obvious lesions.  Musculoskeletal: FROM of the neck and all extremities without any neurological symptoms. Gait is normal. Sensation is intact in all extremities to gross touch.   Psych: Normal mood and affect.       Data reviewed:   none    Assessment & Plan:     34 y.o. male with the following -     1. Sports physical  Normal sports physical done today with no significant findings. Paperwork completed and given back to patient.    2. Autism spectrum disorder    3. Flu vaccine need  - INFLUENZA VACCINE TRI INJ (PF)      No follow-ups on file.    Please note that this dictation was created using voice recognition software. I have made every reasonable attempt to correct obvious errors, but I expect that there are errors of grammar and possibly content that I did not discover before finalizing the note.

## 2025-07-08 ENCOUNTER — APPOINTMENT (OUTPATIENT)
Dept: INTERNAL MEDICINE | Facility: OTHER | Age: 35
End: 2025-07-08
Payer: MEDICAID

## 2025-07-08 VITALS — BODY MASS INDEX: 35.84 KG/M2 | WEIGHT: 257 LBS

## 2025-07-08 DIAGNOSIS — E78.5 HYPERLIPIDEMIA, UNSPECIFIED HYPERLIPIDEMIA TYPE: ICD-10-CM

## 2025-07-08 DIAGNOSIS — E66.812 CLASS 2 OBESITY WITHOUT SERIOUS COMORBIDITY WITH BODY MASS INDEX (BMI) OF 35.0 TO 35.9 IN ADULT, UNSPECIFIED OBESITY TYPE: ICD-10-CM

## 2025-07-08 DIAGNOSIS — Z71.3 DIETARY COUNSELING AND SURVEILLANCE: Primary | ICD-10-CM

## 2025-07-08 PROCEDURE — 97803 MED NUTRITION INDIV SUBSEQ: CPT | Performed by: DIETITIAN, REGISTERED

## 2025-07-08 NOTE — PATIENT INSTRUCTIONS
"I will keep discovering new vegetables  - make my own power bowls  - ok to include [employer] Power Bowls as needed for support    I will keep workouts  - add in some resistance band exercises for more strength    I will start eating and making my own foods  - tell mom I will pass on leftovers and make my own healthy food      PHYSICAL RE-ASSESSMENT  Current Height:  71\"        Ht Readings from Last 1 Encounters:   02/13/24 1.803 m (5' 11\")      Current Weight:  257#        Wt Readings from Last 1 Encounters:   02/13/24 116 kg (256 lb)      BMI:  35     Goal Weight:  190-200#  Lowest weight on WW: 6/24/21- 202.8# lost 102.2#  Recent weight last Friday- 252.6# (lost 54#)     Total Body Water (lbs): 122#  Total Body Water (%): 47.4%  Visceral Fat: 16   (Range: Less than 13)  Total Body Fat: (lbs): 90.6#  % Body Fat: 35.2%   Muscle Mass (lbs): 158.6#  Muscle Mass (%): 62%  Fat-Free Mass: 166.8#  Resting Metabolic Rate: 2200 calories  Weight Loss Calories: 0515-8348 calories  Maintenance Calories: 2770 calories   Protein needs:  grams per day     NUTRITION PHYSICAL ASSESSMENT:  Waist cm: 49.75\"    "

## 2025-07-08 NOTE — PROGRESS NOTES
"Rufus Bianchi is a 35 y.o. year old, male returns for weight management follow up.  [place] present at visit.    Positive changes since last visit:    Bowling now in [employer]- building skills and excelling- WIN!  Brought in wall push ups, up to 300 times once a week- Thursdays  Dancing on Tuesdays with [employer], or park day  Water aerobics Mon/Wed  WW weekly check in  More variety in protein choices: [employer] coffee shake before WW using powder vs prepared  Changed up breakfast: leftover breakfast, more eggs +bowl of cereal- [employer] milk  Eating my fruit and veggies- adding into meals: salads- avocado/cucumber/tomatoes, sandwiches- lettuce/tomato/onions/pickels, soups- onions     Latest Reference Range & Units 12/19/24 08:34   Cholesterol,Tot 100 - 199 mg/dL 159   Triglycerides 0 - 149 mg/dL 116   HDL >=40 mg/dL 40   LDL <100 mg/dL 96   No statin Rx    Meal/Eating/Environment Pattern:    Liked Power Bowls- Kirit carty, advised him not to buy d/t \"processed\".  Eating more of mom's leftovers from restaurants.   Weight gain seen with change in body composition for increased %fat and visceral score.    PHYSICAL RE-ASSESSMENT  Current Height:  71\"        Ht Readings from Last 1 Encounters:   02/13/24 1.803 m (5' 11\")      Current Weight:  257#        Wt Readings from Last 1 Encounters:   02/13/24 116 kg (256 lb)      BMI:  35     Goal Weight:  190-200#  Lowest weight on WW: 6/24/21- 202.8# lost 102.2#  Recent weight last Friday- 252.6# (lost 54#)     Total Body Water (lbs): 122#  Total Body Water (%): 47.4%  Visceral Fat: 16   (Range: Less than 13)  Total Body Fat: (lbs): 90.6#  % Body Fat: 35.2%   Muscle Mass (lbs): 158.6#  Muscle Mass (%): 62%  Fat-Free Mass: 166.8#  Resting Metabolic Rate: 2200 calories  Weight Loss Calories: 4077-4005 calories  Maintenance Calories: 2770 calories   Protein needs:  grams per day     NUTRITION PHYSICAL ASSESSMENT:  Waist cm: " "49.75\"      Comments:    Reinforced Rufus's commitment to maintain his course and have discussion with family members that help him achieve his goals.      RTC x next available    Time Spent:  60 minutes   "

## 2025-08-20 ENCOUNTER — OFFICE VISIT (OUTPATIENT)
Dept: MEDICAL GROUP | Facility: MEDICAL CENTER | Age: 35
End: 2025-08-20
Attending: FAMILY MEDICINE
Payer: MEDICAID

## 2025-08-20 VITALS
RESPIRATION RATE: 16 BRPM | TEMPERATURE: 98.1 F | HEIGHT: 72 IN | HEART RATE: 78 BPM | OXYGEN SATURATION: 95 % | DIASTOLIC BLOOD PRESSURE: 64 MMHG | SYSTOLIC BLOOD PRESSURE: 106 MMHG | BODY MASS INDEX: 34.7 KG/M2 | WEIGHT: 256.2 LBS

## 2025-08-20 DIAGNOSIS — E66.811 CLASS 1 OBESITY DUE TO EXCESS CALORIES WITHOUT SERIOUS COMORBIDITY WITH BODY MASS INDEX (BMI) OF 34.0 TO 34.9 IN ADULT: Primary | ICD-10-CM

## 2025-08-20 DIAGNOSIS — S90.211A SUBUNGUAL HEMATOMA OF GREAT TOE OF RIGHT FOOT, INITIAL ENCOUNTER: ICD-10-CM

## 2025-08-20 DIAGNOSIS — E66.09 CLASS 1 OBESITY DUE TO EXCESS CALORIES WITHOUT SERIOUS COMORBIDITY WITH BODY MASS INDEX (BMI) OF 34.0 TO 34.9 IN ADULT: Primary | ICD-10-CM

## 2025-08-20 PROCEDURE — 3074F SYST BP LT 130 MM HG: CPT | Performed by: FAMILY MEDICINE

## 2025-08-20 PROCEDURE — 3078F DIAST BP <80 MM HG: CPT | Performed by: FAMILY MEDICINE

## 2025-08-20 PROCEDURE — 99212 OFFICE O/P EST SF 10 MIN: CPT | Performed by: FAMILY MEDICINE

## 2025-08-20 PROCEDURE — 99213 OFFICE O/P EST LOW 20 MIN: CPT | Performed by: FAMILY MEDICINE

## 2025-08-20 ASSESSMENT — LIFESTYLE VARIABLES
AUDIT-C TOTAL SCORE: 0
HOW OFTEN DO YOU HAVE SIX OR MORE DRINKS ON ONE OCCASION: NEVER
SKIP TO QUESTIONS 9-10: 1
HOW OFTEN DO YOU HAVE A DRINK CONTAINING ALCOHOL: NEVER
HOW MANY STANDARD DRINKS CONTAINING ALCOHOL DO YOU HAVE ON A TYPICAL DAY: PATIENT DOES NOT DRINK

## 2025-08-29 ENCOUNTER — OFFICE VISIT (OUTPATIENT)
Dept: MEDICAL GROUP | Facility: MEDICAL CENTER | Age: 35
End: 2025-08-29
Attending: FAMILY MEDICINE
Payer: MEDICAID

## 2025-08-29 VITALS
WEIGHT: 259 LBS | HEIGHT: 72 IN | HEART RATE: 69 BPM | DIASTOLIC BLOOD PRESSURE: 72 MMHG | RESPIRATION RATE: 16 BRPM | BODY MASS INDEX: 35.08 KG/M2 | TEMPERATURE: 96.8 F | SYSTOLIC BLOOD PRESSURE: 100 MMHG | OXYGEN SATURATION: 96 %

## 2025-08-29 DIAGNOSIS — Z11.3 ROUTINE SCREENING FOR STI (SEXUALLY TRANSMITTED INFECTION): ICD-10-CM

## 2025-08-29 DIAGNOSIS — E66.09 CLASS 1 OBESITY DUE TO EXCESS CALORIES WITHOUT SERIOUS COMORBIDITY WITH BODY MASS INDEX (BMI) OF 34.0 TO 34.9 IN ADULT: Primary | ICD-10-CM

## 2025-08-29 DIAGNOSIS — Z00.00 WELLNESS EXAMINATION: ICD-10-CM

## 2025-08-29 DIAGNOSIS — E66.811 CLASS 1 OBESITY DUE TO EXCESS CALORIES WITHOUT SERIOUS COMORBIDITY WITH BODY MASS INDEX (BMI) OF 34.0 TO 34.9 IN ADULT: Primary | ICD-10-CM

## 2025-08-29 PROCEDURE — 99214 OFFICE O/P EST MOD 30 MIN: CPT | Performed by: FAMILY MEDICINE

## 2025-08-29 SDOH — HEALTH STABILITY: PHYSICAL HEALTH

## 2025-08-29 SDOH — ECONOMIC STABILITY: TRANSPORTATION INSECURITY

## 2025-08-29 SDOH — ECONOMIC STABILITY: HOUSING INSECURITY

## 2025-08-29 SDOH — HEALTH STABILITY: MENTAL HEALTH

## 2025-08-29 ASSESSMENT — LIFESTYLE VARIABLES: HOW MANY STANDARD DRINKS CONTAINING ALCOHOL DO YOU HAVE ON A TYPICAL DAY: PATIENT DOES NOT DRINK
